# Patient Record
Sex: MALE | Race: WHITE | Employment: FULL TIME | URBAN - NONMETROPOLITAN AREA
[De-identification: names, ages, dates, MRNs, and addresses within clinical notes are randomized per-mention and may not be internally consistent; named-entity substitution may affect disease eponyms.]

---
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
## 2020-06-28 ENCOUNTER — HOSPITAL ENCOUNTER (EMERGENCY)
Facility: OTHER | Age: 20
Discharge: LEFT BEFORE DISCHARGE | End: 2020-06-28
Attending: FAMILY MEDICINE | Admitting: FAMILY MEDICINE

## 2020-06-28 VITALS
HEART RATE: 107 BPM | RESPIRATION RATE: 18 BRPM | HEIGHT: 73 IN | BODY MASS INDEX: 25.77 KG/M2 | WEIGHT: 194.45 LBS | DIASTOLIC BLOOD PRESSURE: 97 MMHG | OXYGEN SATURATION: 100 % | SYSTOLIC BLOOD PRESSURE: 144 MMHG | TEMPERATURE: 97.1 F

## 2020-06-28 DIAGNOSIS — R51.9 HEADACHE: ICD-10-CM

## 2020-06-28 DIAGNOSIS — M54.2 CERVICAL SPINE PAIN: ICD-10-CM

## 2020-06-28 DIAGNOSIS — F15.10 METHAMPHETAMINE USE (H): ICD-10-CM

## 2020-06-28 DIAGNOSIS — M54.9 BACK PAIN: ICD-10-CM

## 2020-06-28 DIAGNOSIS — F11.20 HEROIN ADDICTION (H): ICD-10-CM

## 2020-06-28 DIAGNOSIS — R00.0 TACHYCARDIA: ICD-10-CM

## 2020-06-28 LAB
AMPHETAMINES UR QL SCN: ABNORMAL
ANION GAP SERPL CALCULATED.3IONS-SCNC: 8 MMOL/L (ref 3–14)
APAP SERPL-MCNC: <0.2 UG/ML (ref 0–30)
BARBITURATES UR QL: NOT DETECTED
BASOPHILS # BLD AUTO: 0.1 10E9/L (ref 0–0.2)
BASOPHILS NFR BLD AUTO: 1 %
BENZODIAZ UR QL: NOT DETECTED
BUN SERPL-MCNC: 6 MG/DL (ref 7–25)
BUPRENORPHINE UR QL: NOT DETECTED NG/ML
CALCIUM SERPL-MCNC: 9.5 MG/DL (ref 8.6–10.3)
CANNABINOIDS UR QL: ABNORMAL NG/ML
CHLORIDE SERPL-SCNC: 107 MMOL/L (ref 98–107)
CO2 SERPL-SCNC: 28 MMOL/L (ref 21–31)
COCAINE UR QL: NOT DETECTED
CREAT SERPL-MCNC: 0.86 MG/DL (ref 0.7–1.3)
D-METHAMPHET UR QL: ABNORMAL NG/ML
DIFFERENTIAL METHOD BLD: NORMAL
EOSINOPHIL # BLD AUTO: 0.1 10E9/L (ref 0–0.7)
EOSINOPHIL NFR BLD AUTO: 1.6 %
ERYTHROCYTE [DISTWIDTH] IN BLOOD BY AUTOMATED COUNT: 11.9 % (ref 10–15)
ETHANOL SERPL-MCNC: <0.01 %
GFR SERPL CREATININE-BSD FRML MDRD: >90 ML/MIN/{1.73_M2}
GLUCOSE SERPL-MCNC: 94 MG/DL (ref 70–105)
HCT VFR BLD AUTO: 40.4 % (ref 40–53)
HGB BLD-MCNC: 14 G/DL (ref 13.3–17.7)
IMM GRANULOCYTES # BLD: 0 10E9/L (ref 0–0.4)
IMM GRANULOCYTES NFR BLD: 0.3 %
LYMPHOCYTES # BLD AUTO: 2.7 10E9/L (ref 0.8–5.3)
LYMPHOCYTES NFR BLD AUTO: 38.7 %
MCH RBC QN AUTO: 29.5 PG (ref 26.5–33)
MCHC RBC AUTO-ENTMCNC: 34.7 G/DL (ref 31.5–36.5)
MCV RBC AUTO: 85 FL (ref 78–100)
METHADONE UR QL SCN: NOT DETECTED
MONOCYTES # BLD AUTO: 0.4 10E9/L (ref 0–1.3)
MONOCYTES NFR BLD AUTO: 6 %
NEUTROPHILS # BLD AUTO: 3.6 10E9/L (ref 1.6–8.3)
NEUTROPHILS NFR BLD AUTO: 52.4 %
OPIATES UR QL SCN: NOT DETECTED
OXYCODONE UR QL: NOT DETECTED NG/ML
PCP UR QL SCN: NOT DETECTED
PLATELET # BLD AUTO: 312 10E9/L (ref 150–450)
POTASSIUM SERPL-SCNC: 3.5 MMOL/L (ref 3.5–5.1)
PROPOXYPH UR QL: NOT DETECTED NG/ML
RBC # BLD AUTO: 4.74 10E12/L (ref 4.4–5.9)
SALICYLATES SERPL-MCNC: <0 MG/DL (ref 15–30)
SODIUM SERPL-SCNC: 143 MMOL/L (ref 134–144)
TRICYCLICS UR QL SCN: NOT DETECTED NG/ML
TSH SERPL DL<=0.05 MIU/L-ACNC: 0.47 IU/ML (ref 0.34–5.6)
WBC # BLD AUTO: 6.9 10E9/L (ref 4–11)

## 2020-06-28 PROCEDURE — 85025 COMPLETE CBC W/AUTO DIFF WBC: CPT | Performed by: PHYSICIAN ASSISTANT

## 2020-06-28 PROCEDURE — 99283 EMERGENCY DEPT VISIT LOW MDM: CPT | Mod: Z6 | Performed by: PHYSICIAN ASSISTANT

## 2020-06-28 PROCEDURE — 84443 ASSAY THYROID STIM HORMONE: CPT | Performed by: PHYSICIAN ASSISTANT

## 2020-06-28 PROCEDURE — 80307 DRUG TEST PRSMV CHEM ANLYZR: CPT | Performed by: PHYSICIAN ASSISTANT

## 2020-06-28 PROCEDURE — 80329 ANALGESICS NON-OPIOID 1 OR 2: CPT | Performed by: PHYSICIAN ASSISTANT

## 2020-06-28 PROCEDURE — 36415 COLL VENOUS BLD VENIPUNCTURE: CPT | Performed by: PHYSICIAN ASSISTANT

## 2020-06-28 PROCEDURE — 99283 EMERGENCY DEPT VISIT LOW MDM: CPT | Performed by: PHYSICIAN ASSISTANT

## 2020-06-28 PROCEDURE — 80320 DRUG SCREEN QUANTALCOHOLS: CPT | Performed by: PHYSICIAN ASSISTANT

## 2020-06-28 PROCEDURE — 80048 BASIC METABOLIC PNL TOTAL CA: CPT | Performed by: PHYSICIAN ASSISTANT

## 2020-06-28 ASSESSMENT — ENCOUNTER SYMPTOMS
HEADACHES: 1
ADENOPATHY: 0
CHILLS: 0
BACK PAIN: 1
CONFUSION: 0
VOMITING: 0
DIARRHEA: 1
BRUISES/BLEEDS EASILY: 0
HEMATURIA: 0
SHORTNESS OF BREATH: 0
NECK PAIN: 1
NAUSEA: 1
FEVER: 0
FATIGUE: 1
WOUND: 0
ABDOMINAL PAIN: 0
CHEST TIGHTNESS: 0

## 2020-06-28 ASSESSMENT — MIFFLIN-ST. JEOR: SCORE: 1950.88
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
## 2020-06-29 NOTE — ED NOTES
Pt states that he want to leave, he feels like he can get help on his own. Provider is aware and also spoke with patient.
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
## 2023-06-23 ENCOUNTER — OFFICE VISIT (OUTPATIENT)
Dept: FAMILY MEDICINE | Facility: CLINIC | Age: 23
End: 2023-06-23
Payer: COMMERCIAL

## 2023-06-23 VITALS
SYSTOLIC BLOOD PRESSURE: 120 MMHG | DIASTOLIC BLOOD PRESSURE: 70 MMHG | BODY MASS INDEX: 31.15 KG/M2 | RESPIRATION RATE: 20 BRPM | OXYGEN SATURATION: 98 % | WEIGHT: 230 LBS | HEART RATE: 70 BPM | TEMPERATURE: 98 F | HEIGHT: 72 IN

## 2023-06-23 DIAGNOSIS — R53.83 OTHER FATIGUE: ICD-10-CM

## 2023-06-23 DIAGNOSIS — Z11.59 NEED FOR HEPATITIS C SCREENING TEST: ICD-10-CM

## 2023-06-23 DIAGNOSIS — F19.11 HISTORY OF SUBSTANCE ABUSE (H): ICD-10-CM

## 2023-06-23 DIAGNOSIS — Z13.220 SCREENING CHOLESTEROL LEVEL: ICD-10-CM

## 2023-06-23 DIAGNOSIS — I10 HYPERTENSION, UNSPECIFIED TYPE: ICD-10-CM

## 2023-06-23 DIAGNOSIS — Z00.00 ROUTINE GENERAL MEDICAL EXAMINATION AT A HEALTH CARE FACILITY: Primary | ICD-10-CM

## 2023-06-23 DIAGNOSIS — Z11.4 SCREENING FOR HIV (HUMAN IMMUNODEFICIENCY VIRUS): ICD-10-CM

## 2023-06-23 DIAGNOSIS — Z13.1 SCREENING FOR DIABETES MELLITUS: ICD-10-CM

## 2023-06-23 LAB
ALBUMIN SERPL BCG-MCNC: 4.8 G/DL (ref 3.5–5.2)
ALP SERPL-CCNC: 112 U/L (ref 40–129)
ALT SERPL W P-5'-P-CCNC: 39 U/L (ref 0–70)
ANION GAP SERPL CALCULATED.3IONS-SCNC: 11 MMOL/L (ref 7–15)
AST SERPL W P-5'-P-CCNC: 30 U/L (ref 0–45)
BASOPHILS # BLD AUTO: 0.1 10E3/UL (ref 0–0.2)
BASOPHILS NFR BLD AUTO: 1 %
BILIRUB SERPL-MCNC: 0.5 MG/DL
BUN SERPL-MCNC: 17 MG/DL (ref 6–20)
CALCIUM SERPL-MCNC: 9.5 MG/DL (ref 8.6–10)
CHLORIDE SERPL-SCNC: 105 MMOL/L (ref 98–107)
CHOLEST SERPL-MCNC: 180 MG/DL
CREAT SERPL-MCNC: 0.71 MG/DL (ref 0.67–1.17)
DEPRECATED CALCIDIOL+CALCIFEROL SERPL-MC: 20 UG/L (ref 20–75)
DEPRECATED HCO3 PLAS-SCNC: 24 MMOL/L (ref 22–29)
EOSINOPHIL # BLD AUTO: 0.2 10E3/UL (ref 0–0.7)
EOSINOPHIL NFR BLD AUTO: 3 %
ERYTHROCYTE [DISTWIDTH] IN BLOOD BY AUTOMATED COUNT: 12.5 % (ref 10–15)
GFR SERPL CREATININE-BSD FRML MDRD: >90 ML/MIN/1.73M2
GLUCOSE SERPL-MCNC: 77 MG/DL (ref 70–99)
HBA1C MFR BLD: 5.1 % (ref 0–5.6)
HCT VFR BLD AUTO: 41.3 % (ref 40–53)
HCV AB SERPL QL IA: NONREACTIVE
HDLC SERPL-MCNC: 44 MG/DL
HGB BLD-MCNC: 14 G/DL (ref 13.3–17.7)
HIV 1+2 AB+HIV1 P24 AG SERPL QL IA: NONREACTIVE
IMM GRANULOCYTES # BLD: 0 10E3/UL
IMM GRANULOCYTES NFR BLD: 0 %
LDLC SERPL CALC-MCNC: 111 MG/DL
LYMPHOCYTES # BLD AUTO: 2.3 10E3/UL (ref 0.8–5.3)
LYMPHOCYTES NFR BLD AUTO: 40 %
MCH RBC QN AUTO: 28.2 PG (ref 26.5–33)
MCHC RBC AUTO-ENTMCNC: 33.9 G/DL (ref 31.5–36.5)
MCV RBC AUTO: 83 FL (ref 78–100)
MONOCYTES # BLD AUTO: 0.4 10E3/UL (ref 0–1.3)
MONOCYTES NFR BLD AUTO: 7 %
NEUTROPHILS # BLD AUTO: 2.7 10E3/UL (ref 1.6–8.3)
NEUTROPHILS NFR BLD AUTO: 48 %
NONHDLC SERPL-MCNC: 136 MG/DL
PLATELET # BLD AUTO: 287 10E3/UL (ref 150–450)
POTASSIUM SERPL-SCNC: 4.3 MMOL/L (ref 3.4–5.3)
PROT SERPL-MCNC: 7.4 G/DL (ref 6.4–8.3)
RBC # BLD AUTO: 4.96 10E6/UL (ref 4.4–5.9)
SODIUM SERPL-SCNC: 140 MMOL/L (ref 136–145)
TRIGL SERPL-MCNC: 125 MG/DL
TSH SERPL DL<=0.005 MIU/L-ACNC: 2 UIU/ML (ref 0.3–4.2)
VIT B12 SERPL-MCNC: 436 PG/ML (ref 232–1245)
WBC # BLD AUTO: 5.7 10E3/UL (ref 4–11)

## 2023-06-23 PROCEDURE — 82607 VITAMIN B-12: CPT | Performed by: FAMILY MEDICINE

## 2023-06-23 PROCEDURE — 80061 LIPID PANEL: CPT | Performed by: FAMILY MEDICINE

## 2023-06-23 PROCEDURE — 82306 VITAMIN D 25 HYDROXY: CPT | Performed by: FAMILY MEDICINE

## 2023-06-23 PROCEDURE — 83036 HEMOGLOBIN GLYCOSYLATED A1C: CPT | Performed by: FAMILY MEDICINE

## 2023-06-23 PROCEDURE — 86803 HEPATITIS C AB TEST: CPT | Performed by: FAMILY MEDICINE

## 2023-06-23 PROCEDURE — 36415 COLL VENOUS BLD VENIPUNCTURE: CPT | Performed by: FAMILY MEDICINE

## 2023-06-23 PROCEDURE — 99214 OFFICE O/P EST MOD 30 MIN: CPT | Mod: 25 | Performed by: FAMILY MEDICINE

## 2023-06-23 PROCEDURE — 80050 GENERAL HEALTH PANEL: CPT | Performed by: FAMILY MEDICINE

## 2023-06-23 PROCEDURE — 87389 HIV-1 AG W/HIV-1&-2 AB AG IA: CPT | Performed by: FAMILY MEDICINE

## 2023-06-23 PROCEDURE — 99385 PREV VISIT NEW AGE 18-39: CPT | Performed by: FAMILY MEDICINE

## 2023-06-23 RX ORDER — QUETIAPINE FUMARATE 100 MG/1
100 TABLET, FILM COATED ORAL
COMMUNITY
Start: 2023-06-21 | End: 2023-09-26

## 2023-06-23 RX ORDER — VIT C/B6/B5/MAGNESIUM/HERB 173 50-5-6-5MG
2000 CAPSULE ORAL DAILY
COMMUNITY
Start: 2023-01-06

## 2023-06-23 RX ORDER — MIRTAZAPINE 30 MG/1
30 TABLET, ORALLY DISINTEGRATING ORAL
COMMUNITY
Start: 2022-12-30 | End: 2023-09-26

## 2023-06-23 RX ORDER — LISINOPRIL 5 MG/1
TABLET ORAL
COMMUNITY
Start: 2023-06-21 | End: 2023-06-23

## 2023-06-23 RX ORDER — BUPRENORPHINE HYDROCHLORIDE, NALOXONE HYDROCHLORIDE 12; 3 MG/1; MG/1
FILM, SOLUBLE BUCCAL; SUBLINGUAL
COMMUNITY
Start: 2023-05-31 | End: 2023-09-26

## 2023-06-23 ASSESSMENT — ANXIETY QUESTIONNAIRES
5. BEING SO RESTLESS THAT IT IS HARD TO SIT STILL: MORE THAN HALF THE DAYS
3. WORRYING TOO MUCH ABOUT DIFFERENT THINGS: NOT AT ALL
GAD7 TOTAL SCORE: 4
GAD7 TOTAL SCORE: 4
7. FEELING AFRAID AS IF SOMETHING AWFUL MIGHT HAPPEN: NOT AT ALL
6. BECOMING EASILY ANNOYED OR IRRITABLE: NOT AT ALL
1. FEELING NERVOUS, ANXIOUS, OR ON EDGE: MORE THAN HALF THE DAYS
IF YOU CHECKED OFF ANY PROBLEMS ON THIS QUESTIONNAIRE, HOW DIFFICULT HAVE THESE PROBLEMS MADE IT FOR YOU TO DO YOUR WORK, TAKE CARE OF THINGS AT HOME, OR GET ALONG WITH OTHER PEOPLE: VERY DIFFICULT
2. NOT BEING ABLE TO STOP OR CONTROL WORRYING: NOT AT ALL

## 2023-06-23 ASSESSMENT — ENCOUNTER SYMPTOMS
DIARRHEA: 0
DIZZINESS: 0
NAUSEA: 0
FREQUENCY: 0
PALPITATIONS: 0
HEMATOCHEZIA: 0
HEARTBURN: 0
JOINT SWELLING: 0
PARESTHESIAS: 0
WEAKNESS: 0
HEADACHES: 0
CHILLS: 0
ARTHRALGIAS: 0
HEMATURIA: 0
NERVOUS/ANXIOUS: 0
EYE PAIN: 0
SORE THROAT: 0
CONSTIPATION: 0
DYSURIA: 0
SHORTNESS OF BREATH: 0
COUGH: 0
FEVER: 0
MYALGIAS: 0
ABDOMINAL PAIN: 0

## 2023-06-23 ASSESSMENT — PAIN SCALES - GENERAL: PAINLEVEL: NO PAIN (0)

## 2023-06-23 ASSESSMENT — PATIENT HEALTH QUESTIONNAIRE - PHQ9
SUM OF ALL RESPONSES TO PHQ QUESTIONS 1-9: 11
5. POOR APPETITE OR OVEREATING: NOT AT ALL

## 2023-06-23 NOTE — LETTER
June 26, 2023      Ilir Pal  [address]   [place] [address]        Dear ,    Here is a copy of your test results, as requested.     Resulted Orders   HIV Antigen Antibody Combo   Result Value Ref Range    HIV Antigen Antibody Combo Nonreactive Nonreactive      Comment:      HIV-1 p24 Ag & HIV-1/HIV-2 Ab Not Detected   Hepatitis C Screen Reflex to HCV RNA Quant and Genotype   Result Value Ref Range    Hepatitis C Antibody Nonreactive Nonreactive    Narrative    Assay performance characteristics have not been established for newborns, infants, and children.   Comprehensive metabolic panel (BMP + Alb, Alk Phos, ALT, AST, Total. Bili, TP)   Result Value Ref Range    Sodium 140 136 - 145 mmol/L    Potassium 4.3 3.4 - 5.3 mmol/L    Chloride 105 98 - 107 mmol/L    Carbon Dioxide (CO2) 24 22 - 29 mmol/L    Anion Gap 11 7 - 15 mmol/L    Urea Nitrogen 17.0 6.0 - 20.0 mg/dL    Creatinine 0.71 0.67 - 1.17 mg/dL    Calcium 9.5 8.6 - 10.0 mg/dL    Glucose 77 70 - 99 mg/dL    Alkaline Phosphatase 112 40 - 129 U/L    AST 30 0 - 45 U/L      Comment:      Reference intervals for this test were updated on 6/12/2023 to more accurately reflect our healthy population. There may be differences in the flagging of prior results with similar values performed with this method. Interpretation of those prior results can be made in the context of the updated reference intervals.    ALT 39 0 - 70 U/L      Comment:      Reference intervals for this test were updated on 6/12/2023 to more accurately reflect our healthy population. There may be differences in the flagging of prior results with similar values performed with this method. Interpretation of those prior results can be made in the context of the updated reference intervals.      Protein Total 7.4 6.4 - 8.3 g/dL    Albumin 4.8 3.5 - 5.2 g/dL    Bilirubin Total 0.5 <=1.2 mg/dL    GFR Estimate >90 >60 mL/min/1.73m2   TSH with free T4 reflex   Result Value Ref Range     TSH 2.00 0.30 - 4.20 uIU/mL   Vitamin D Deficiency   Result Value Ref Range    Vitamin D, Total (25-Hydroxy) 20 20 - 75 ug/L    Narrative    Season, race, dietary intake, and treatment affect the concentration of 25-hydroxy-Vitamin D. Values may decrease during winter months and increase during summer months. Values 20-29 ug/L may indicate Vitamin D insufficiency and values <20 ug/L may indicate Vitamin D deficiency.    Vitamin D determination is routinely performed by an immunoassay specific for 25 hydroxyvitamin D3.  If an individual is on vitamin D2(ergocalciferol) supplementation, please specify 25 OH vitamin D2 and D3 level determination by LCMSMS test VITD23.     Vitamin B12   Result Value Ref Range    Vitamin B12 436 232 - 1,245 pg/mL   Hemoglobin A1c   Result Value Ref Range    Hemoglobin A1C 5.1 0.0 - 5.6 %      Comment:      Normal <5.7%   Prediabetes 5.7-6.4%    Diabetes 6.5% or higher     Note: Adopted from ADA consensus guidelines.   Lipid panel reflex to direct LDL Non-fasting   Result Value Ref Range    Cholesterol 180 <200 mg/dL    Triglycerides 125 <150 mg/dL    Direct Measure HDL 44 >=40 mg/dL    LDL Cholesterol Calculated 111 (H) <=100 mg/dL    Non HDL Cholesterol 136 (H) <130 mg/dL    Narrative    Cholesterol  Desirable:  <200 mg/dL    Triglycerides  Normal:  Less than 150 mg/dL  Borderline High:  150-199 mg/dL  High:  200-499 mg/dL  Very High:  Greater than or equal to 500 mg/dL    Direct Measure HDL  Female:  Greater than or equal to 50 mg/dL   Male:  Greater than or equal to 40 mg/dL    LDL Cholesterol  Desirable:  <100mg/dL  Above Desirable:  100-129 mg/dL   Borderline High:  130-159 mg/dL   High:  160-189 mg/dL   Very High:  >= 190 mg/dL    Non HDL Cholesterol  Desirable:  130 mg/dL  Above Desirable:  130-159 mg/dL  Borderline High:  160-189 mg/dL  High:  190-219 mg/dL  Very High:  Greater than or equal to 220 mg/dL   CBC with platelets and differential   Result Value Ref Range    WBC Count  5.7 4.0 - 11.0 10e3/uL    RBC Count 4.96 4.40 - 5.90 10e6/uL    Hemoglobin 14.0 13.3 - 17.7 g/dL    Hematocrit 41.3 40.0 - 53.0 %    MCV 83 78 - 100 fL    MCH 28.2 26.5 - 33.0 pg    MCHC 33.9 31.5 - 36.5 g/dL    RDW 12.5 10.0 - 15.0 %    Platelet Count 287 150 - 450 10e3/uL    % Neutrophils 48 %    % Lymphocytes 40 %    % Monocytes 7 %    % Eosinophils 3 %    % Basophils 1 %    % Immature Granulocytes 0 %    Absolute Neutrophils 2.7 1.6 - 8.3 10e3/uL    Absolute Lymphocytes 2.3 0.8 - 5.3 10e3/uL    Absolute Monocytes 0.4 0.0 - 1.3 10e3/uL    Absolute Eosinophils 0.2 0.0 - 0.7 10e3/uL    Absolute Basophils 0.1 0.0 - 0.2 10e3/uL    Absolute Immature Granulocytes 0.0 <=0.4 10e3/uL       If you have any questions or concerns, please call the clinic at the number listed above.       Sincerely,      Enio Valdez, DO

## 2023-06-23 NOTE — PATIENT INSTRUCTIONS
Follow up with suboxone clinic/ Psychiatry.     Lab work today.     Stop Lisinopril     BP goal is less than 140/90    HOW TO CHECK YOUR BLOOD PRESSURE AT HOME:      Avoid eating, smoking, and exercising for at least 30 minutes before taking a reading.     Be sure you have taken your BP medication at least 2-3 hours before you check it.      Sit quietly for 10 minutes before a reading.      Sit in a chair with your feet flat on the floor. Rest your  arm on a table so that the arm cuff is at the same level as your heart.     Remain still during the reading.     Record your blood pressure and pulse in a log and bring to your next appointment.        Sleep Hygiene    1. Avoid doing anything stressful in the hour before bedtime. Avoid paying bills, watching politics on the news, etc.  2. Avoid screens just before bedtime. This includes cell phones, iPad, computers, TV. The bright lights on the screen is very stimulating to the brain, and makes it difficult to follow asleep and stay asleep  3. Avoid alcohol. Alcohol can sometimes make it easier to fall asleep, but significantly reduces the chance that you will stay asleep. It also impairs REM sleep, which is the good restful sleep  4. Use the bed for sleep and sex only. Avoid eating, reading, watching TV in bed  5. If you feel very restless at bedtime, try doing mundane physical activities such as vacuuming, folding laundry, etc.  6. If you find yourself making lists in your head at night, keep pen and paper at the bedside. Write down these lists. Also visualize yourself checking that item off your list and removing it from your brain.  7. Keep the room cool and dark. Consider investing in late darkening curtains  8. Avoid drinking excessive fluids just before bedtime. Empty your bladder just before bedtime  9. Cognitive behavioral therapy (CBT) has been proven to be highly effective to treat insomnia.           Preventive Health Recommendations  Male Ages 21 - 25      Yearly exam:             See your health care provider every year in order to  o   Review health changes.   o   Discuss preventive care.    o   Review your medicines if your doctor has prescribed any.  You should be tested each year for STDs (sexually transmitted diseases).   Talk to your provider about cholesterol testing.    If you are at risk for diabetes, you should have a diabetes test (fasting glucose).    Shots: Get a flu shot each year. Get a tetanus shot every 10 years.     Nutrition:  Eat at least 5 servings of fruits and vegetables daily.   Eat whole-grain bread, whole-wheat pasta and brown rice instead of white grains and rice.   Get adequate calcium and Vitamin D.     Lifestyle  Exercise for at least 150 minutes a week (30 minutes a day, 5 days a week). This will help you control your weight and prevent disease.   Limit alcohol to one drink per day.   No smoking.   Wear sunscreen to prevent skin cancer.   See your dentist every six months for an exam and cleaning.

## 2023-06-23 NOTE — PROGRESS NOTES
SUBJECTIVE:   CC: Ilir is an 22 year old who presents for preventative health visit.                  Healthy Habits:     Getting at least 3 servings of Calcium per day:  Yes    Bi-annual eye exam:  Yes    Dental care twice a year:  Yes    Sleep apnea or symptoms of sleep apnea:  Daytime drowsiness    Diet:  Regular (no restrictions)    Frequency of exercise:  4-5 days/week    Duration of exercise:  Less than 15 minutes    Taking medications regularly:  Yes    Medication side effects:  Not applicable and None    PHQ-2 Total Score: 2    Additional concerns today:  No      22-year-old male who presents to clinic for annual physical and to establish care.      History of substance abuse   8 months sober. Meth and heroin drugs of choice in the past.     Inpatient treatment in [place]  Now doing outpatient treatment in [place]. Now living at a sober house. Plans to go and move in with his grandmother in August.     Currently follows with suboxone clinic Dr. Srinivasan in [place]    Currently on suboxone   Follows with Dr. Srinivasan     Reports significant fatigue even after sleeping for prolonged periods.   Symptoms worsened with mirtazapine and also quetiapine. He has only been using these on the weekends. He does not notice any change in his fatigue when he takes the medications or not.   Even when not taking these medications will be quite fatigued.       HTN  Elevated blood pressure when using drugs.   On Lisinopril 5 mg about twice per week.   Has not used this for the past week.   BP within goal range.       Chief Complaint   Patient presents with     Physical     Establish Care     Fatigue     8 months in recovery. Possible ADHD. Will be needing dental work and wondering what the pain management will look like?         Have you ever done Advance Care Planning? (For example, a Health Directive, POLST, or a discussion with a medical provider or your loved ones about your wishes): No, advance care planning  "information given to patient to review.  Patient declined advance care planning discussion at this time.    Social History     Tobacco Use     Smoking status: Never     Passive exposure: Yes     Smokeless tobacco: Never     Tobacco comments:     Quit smoking: dad outside on occasion   Substance Use Topics     Alcohol use: No     Alcohol/week: 0.0 standard drinks of alcohol             6/23/2023     9:32 AM   Alcohol Use   Prescreen: >3 drinks/day or >7 drinks/week? No       Last PSA: No results found for: PSA    Reviewed orders with patient. Reviewed health maintenance and updated orders accordingly - Yes    Reviewed and updated as needed this visit by clinical staff   Tobacco  Allergies  Meds  Problems  Med Hx  Surg Hx  Fam Hx          Reviewed and updated as needed this visit by Provider                     Review of Systems   Constitutional: Negative for chills and fever.   HENT: Negative for congestion, ear pain, hearing loss and sore throat.    Eyes: Negative for pain and visual disturbance.   Respiratory: Negative for cough and shortness of breath.    Cardiovascular: Negative for chest pain, palpitations and peripheral edema.   Gastrointestinal: Negative for abdominal pain, constipation, diarrhea, heartburn, hematochezia and nausea.   Genitourinary: Negative for dysuria, frequency, genital sores, hematuria, impotence, penile discharge and urgency.   Musculoskeletal: Negative for arthralgias, joint swelling and myalgias.   Skin: Negative for rash.   Neurological: Negative for dizziness, weakness, headaches and paresthesias.   Psychiatric/Behavioral: Negative for mood changes. The patient is not nervous/anxious.        OBJECTIVE:   /70 (BP Location: Left arm, Patient Position: Chair, Cuff Size: Adult Regular)   Pulse 70   Temp 98  F (36.7  C) (Tympanic)   Resp 20   Ht 1.816 m (5' 11.5\")   Wt 104.3 kg (230 lb)   SpO2 98%   BMI 31.63 kg/m      Physical Exam  GENERAL: healthy, alert and no " distress  EYES: Eyes grossly normal to inspection, PERRL and conjunctivae and sclerae normal  HENT: ear canals and TM's normal, nose and mouth without ulcers or lesions  NECK: no adenopathy, no asymmetry, masses, or scars and thyroid normal to palpation  RESP: lungs clear to auscultation - no rales, rhonchi or wheezes  CV: regular rate and rhythm, normal S1 S2, no S3 or S4, no murmur, click or rub, no peripheral edema and peripheral pulses strong  ABDOMEN: soft, nontender, no hepatosplenomegaly, no masses and bowel sounds normal  MS: no gross musculoskeletal defects noted, no edema  SKIN: no suspicious lesions or rashes  NEURO: Normal strength and tone, mentation intact and speech normal  PSYCH: mentation appears normal, affect normal/bright    ASSESSMENT/PLAN:   Ilir was seen today for physical, establish care and fatigue.    Diagnoses and all orders for this visit:    Routine general medical examination at a health care facility    Need for hepatitis C screening test  -     Hepatitis C Screen Reflex to HCV RNA Quant and Genotype    Screening for HIV (human immunodeficiency virus)  -     HIV Antigen Antibody Combo    Screening for diabetes mellitus  -     Hemoglobin A1c    Screening cholesterol level  -     Lipid panel reflex to direct LDL Non-fasting    History of substance abuse (H)  History of meth and heroin use. 8 months sober. Completed inpatient treatment in [place]. Now living in sober house, plans to move in with grandma in the future.   -- Currently on suboxone and getting this through Psychiatry Dr. Srinivasan.  -- wishes to establish with a closer suboxone provider. Referral placed for patient to establish care with new Psychiatrist for medication management.   -     Adult Mental Health  Referral; Future    Other fatigue  Reports chronic fatigue. No recent blood test. Will check labs today. Symptoms could be related to prior drug use.   -     CBC with platelets and differential  -      Comprehensive metabolic panel (BMP + Alb, Alk Phos, ALT, AST, Total. Bili, TP)  -     TSH with free T4 reflex  -     Vitamin D Deficiency  -     Vitamin B12  -     Adult Mental Health  Referral; Future    Hypertension, unspecified type  Taking lisinopril 2-3 times per week. Has not taken it in the last week. BP well controlled at 120/70. HTN in past likely related to drug use.   -- Plan to stop lisinopril, monitor blood pressure at home.     Patient has been advised of split billing requirements and indicates understanding: Yes      COUNSELING:   Reviewed preventive health counseling, as reflected in patient instructions       Regular exercise       Healthy diet/nutrition        He reports that he has never smoked. He has been exposed to tobacco smoke. He has never used smokeless tobacco.            Enio Valdez DO  [facility]

## 2023-07-26 ENCOUNTER — OFFICE VISIT (OUTPATIENT)
Dept: FAMILY MEDICINE | Facility: CLINIC | Age: 23
End: 2023-07-26
Payer: COMMERCIAL

## 2023-07-26 VITALS
OXYGEN SATURATION: 98 % | DIASTOLIC BLOOD PRESSURE: 70 MMHG | SYSTOLIC BLOOD PRESSURE: 110 MMHG | WEIGHT: 236 LBS | HEIGHT: 72 IN | TEMPERATURE: 98 F | RESPIRATION RATE: 20 BRPM | BODY MASS INDEX: 31.97 KG/M2 | HEART RATE: 67 BPM

## 2023-07-26 DIAGNOSIS — R21 RASH AND NONSPECIFIC SKIN ERUPTION: ICD-10-CM

## 2023-07-26 DIAGNOSIS — K08.9 POOR DENTITION: ICD-10-CM

## 2023-07-26 DIAGNOSIS — L40.9 PSORIASIS: ICD-10-CM

## 2023-07-26 DIAGNOSIS — R53.82 CHRONIC FATIGUE: ICD-10-CM

## 2023-07-26 DIAGNOSIS — L71.9 ROSACEA: ICD-10-CM

## 2023-07-26 DIAGNOSIS — K12.2 ORAL INFECTION: ICD-10-CM

## 2023-07-26 DIAGNOSIS — F19.11 HISTORY OF SUBSTANCE ABUSE (H): Primary | ICD-10-CM

## 2023-07-26 DIAGNOSIS — E55.9 VITAMIN D DEFICIENCY: ICD-10-CM

## 2023-07-26 PROCEDURE — 36415 COLL VENOUS BLD VENIPUNCTURE: CPT | Performed by: FAMILY MEDICINE

## 2023-07-26 PROCEDURE — 99214 OFFICE O/P EST MOD 30 MIN: CPT | Performed by: FAMILY MEDICINE

## 2023-07-26 PROCEDURE — 84403 ASSAY OF TOTAL TESTOSTERONE: CPT | Performed by: FAMILY MEDICINE

## 2023-07-26 RX ORDER — BUPRENORPHINE HYDROCHLORIDE AND NALOXONE HYDROCHLORIDE DIHYDRATE 8; 2 MG/1; MG/1
TABLET SUBLINGUAL
COMMUNITY
Start: 2023-07-22

## 2023-07-26 RX ORDER — BUPROPION HYDROCHLORIDE 150 MG/1
1 TABLET ORAL
COMMUNITY
Start: 2023-07-21

## 2023-07-26 RX ORDER — CLINDAMYCIN HCL 300 MG
CAPSULE ORAL
COMMUNITY
Start: 2023-07-21

## 2023-07-26 RX ORDER — METRONIDAZOLE 7.5 MG/G
GEL TOPICAL 2 TIMES DAILY
Qty: 45 G | Refills: 1 | Status: SHIPPED | OUTPATIENT
Start: 2023-07-26 | End: 2023-09-26

## 2023-07-26 RX ORDER — CLONIDINE HYDROCHLORIDE 0.2 MG/1
0.2 TABLET ORAL AT BEDTIME
COMMUNITY
Start: 2023-07-21

## 2023-07-26 ASSESSMENT — PAIN SCALES - GENERAL: PAINLEVEL: NO PAIN (0)

## 2023-07-26 NOTE — LETTER
July 28, 2023      Ilir Pal  [address]  [place] [address]        Dear Sesar,    We are writing to inform you of your test results.  Testosterone level satisfactory.       Resulted Orders   Testosterone, total   Result Value Ref Range    Testosterone Total 288 240 - 950 ng/dL       If you have any questions or concerns, please call the clinic at the number listed above.       Sincerely,      Enio Valdez, DO

## 2023-07-26 NOTE — PATIENT INSTRUCTIONS
Start on metrogel twice daily for lesions on face.     Follow up with Dermatology.     Continue cares with Psychiatry.     Best of luck with upcoming dental procedure.     Continue to take vitamin d supplementation.     Lab work today.     Follow up in 2 months.

## 2023-07-26 NOTE — PROGRESS NOTES
Assessment & Plan     History of substance abuse (H)  Follows with Psychiatry.   On suboxone tablets. 1.5 tablets ( 8-2 mg )  On wellbutrin 150 mg daily.   Using clonidine 0.2 mg at night.   Follows with [facility] Psychiatrist.     Rash and nonspecific skin eruption  Erythematous lesion on face near nasolabial fold. Not responding to topical steroids. Treat with metrogel twice daily. Refer to Dermatology.   - Adult Dermatology Referral  - metroNIDAZOLE (METROGEL) 0.75 % external gel  Dispense: 45 g; Refill: 1    Psoriasis  Long standing history of this. Not well controlled. Wishes to see Dermatology. Referral placed.   - Adult Dermatology Referral    Rosacea  - metroNIDAZOLE (METROGEL) 0.75 % external gel  Dispense: 45 g; Refill: 1    Chronic fatigue  Vit D deficiency.   Fatigue, prior lab work with hepatitis C, HIV, CBC, CMP, TSH, vitamin B12, hemoglobin A1c, cholesterol testing returned satisfactory.  Had slightly low vitamin D at 20.  He has been on supplementation for the past month.  --Psychiatrist has concerns about potentially low testosterone.  Will obtain lab value for this today.  --I feel that his fatigue is likely related to his history of meth and heroin use.  - Testosterone, total  On Vit D supplementation.     Poor dentition   Mouth infection  On clindamycin for this. Has appt with Dentistry tomorrow. Plans to proceed with removal of 11+ teeth due to decay and cracking.     The risks, benefits and treatment options of prescribed medications or other treatments have been discussed with the patient. The patient verbalized their understanding and should call or follow up if no improvement or if they develop further problems.    Follow up in 2 months or sooner if needed.     >30 minutes spent on the date of the encounter doing chart review, history and exam, documentation and further activities as noted above      Enio Valdez DO  [facility] 22  "year old, presenting for the following health issues:  Hypertension        7/26/2023     10:43 AM   Additional Questions   Roomed by Mallory KUO   Accompanied by Paternal grandmother, Zaida     History of Present Illness       Reason for visit:  Recheck    He eats 0-1 servings of fruits and vegetables daily.He consumes 4 sweetened beverage(s) daily.He exercises with enough effort to increase his heart rate 60 or more minutes per day.  He exercises with enough effort to increase his heart rate 5 days per week.   He is taking medications regularly.       22 year old male who presents to clinic for follow up. Recently seen on 6/23/2023 for annual physical.     History of substance abuse   remains sober. Meth and heroin drugs of choice in the past.      Inpatient treatment in [place]  Now doing outpatient treatment in [place]. Now living at a sober house. Plans to go and move in with his grandmother in August.       On suboxone tablets. 1.5 tablets ( 8-2 mg )  On wellbutrin 150 mg daily.   Using clonidine 0.2 mg at night.   Follows with [facility] Psychiatrist.       Psoriasis  Currently using kenalog 0.5% cream and also tretinoin   Reports having psoriasis on the face, and scalp.  He reports trying all the shampoos without benefit.   Has quite long hair and discussed cutting this may help.       Mouth infection.   On Clindamycin 300 mg every 8 hours.   Sees dentist tomorrow for a consult as will need to have about 11 teeth removed.       Review of Systems   Constitutional, HEENT, cardiovascular, pulmonary, gi and gu systems are negative, except as otherwise noted.      Objective    /70 (BP Location: Left arm, Patient Position: Chair, Cuff Size: Adult Regular)   Pulse 67   Temp 98  F (36.7  C) (Tympanic)   Resp 20   Ht 1.816 m (5' 11.5\")   Wt 107 kg (236 lb)   SpO2 98%   BMI 32.46 kg/m    Body mass index is 32.46 kg/m .  Physical Exam   General: no acute distress  Mouth: very poor dentition.   HEENT: NC/AT, " PERRL, nares patent, oropharynx clear and non-erythematous.   Head: erythema, scaling near the hairline over the forehead. Long hair in dreads. Small patches of erythema and dry skin next to nasolabial folds and anterior to the left ear.   CV: RRR, no murmur  Resp: non-labored breathing, clear to auscultation, no wheezing or rales   Abdomen: Soft, non-tender, no guarding.   Extremities: No peripheral edema, calves non-tender.

## 2023-07-28 LAB — TESTOST SERPL-MCNC: 288 NG/DL (ref 240–950)

## 2023-08-25 NOTE — PROGRESS NOTES
Chief Complaint   Patient presents with    Ear Problem     History of perforation in left ear- did have surgery about age 12  with relief but in high school perforation noted again /audio     History of Present Illness  Ilir Pal is a 23 year old male who presents today for ear evaluation.  Has a history of ear tube placement as a child.  He had a persistent tympanic membrane perforation on the left-hand side.  He underwent tympanoplasty for closure when he was in middle school.  The patient does note that the hole was closed for some time but then apparently reoccurred.  He had a persistent hole on the left-hand side.  When he gets water in his ear, this bothers him.  He denies any significant drainage.  He does feel like the hearing is not as good on the left versus the right.  No significant dizziness, tinnitus, or vertigo.  He does have a past history of opioid dependence and does use Suboxone currently.      Past Medical History  Patient Active Problem List   Diagnosis    History of substance abuse (H)    HTN (hypertension)    Chronic fatigue    Psoriasis    Vitamin D deficiency     Current Medications    Current Outpatient Medications:     buprenorphine-naloxone (SUBOXONE) 8-2 MG SUBL sublingual tablet, PLACE 1.5 TABLET UNDER THE TONGUE TWICE DAILY, Disp: , Rfl:     buPROPion (WELLBUTRIN XL) 150 MG 24 hr tablet, Take 1 tablet by mouth daily at 2 pm, Disp: , Rfl:     clindamycin (CLEOCIN) 300 MG capsule, take 1 capsule by mouth every 8 hours, Disp: , Rfl:     clindamycin (CLINDAMAX) 1 % topical gel, Apply topically 2 times daily, Disp: , Rfl:     cloNIDine (CATAPRES) 0.2 MG tablet, Take 0.2 mg by mouth At Bedtime, Disp: , Rfl:     D 1000 25 MCG (1000 UT) CHEW, Take 2,000 Units by mouth daily, Disp: , Rfl:     metroNIDAZOLE (METROGEL) 0.75 % external gel, Apply topically 2 times daily, Disp: 45 g, Rfl: 1    mirtazapine (REMERON SOL-TAB) 30 MG ODT, Take 30 mg by mouth nightly as needed, Disp: , Rfl:      QUEtiapine (SEROQUEL) 100 MG tablet, Take 100 mg by mouth nightly as needed, Disp: , Rfl:     tretinoin (RETIN-A) 0.025 % cream, Apply topically At Bedtime Apply to affected areas., Disp: , Rfl:     triamcinolone (KENALOG) 0.5 % cream, Apply topically 2 times daily Apply to affected areas., Disp: , Rfl:     SUBOXONE 12-3 MG FILM per film, PLACE 1 FILM UNDER THE TONGUE TWICE DAILY. PLEASE SEE FULL ATTACHED DIRECTIONS (Patient not taking: Reported on 8/30/2023), Disp: , Rfl:     Allergies  Allergies   Allergen Reactions    Penicillins Rash       Social History  Social History     Socioeconomic History    Marital status: Single   Tobacco Use    Smoking status: Never     Passive exposure: Yes    Smokeless tobacco: Never    Tobacco comments:     Quit smoking: dad outside on occasion   Vaping Use    Vaping Use: Every day    Start date: 10/23/2022    Substances: Nicotine, Flavoring    Devices: Disposable   Substance and Sexual Activity    Alcohol use: No     Alcohol/week: 0.0 standard drinks of alcohol    Drug use: Unknown     Types: Other     Comment: Drug use: No   Social History Narrative    Mom has 4 children.    Ilir loves school.   Lives with parents and sibs. Involved in sports.       Family History  Family History   Problem Relation Age of Onset    Substance Abuse Mother     Coronary Artery Disease Father         MI    Diabetes Maternal Grandmother     Coronary Artery Disease Maternal Grandmother         stents    Alcoholism Maternal Grandfather     Other - See Comments Paternal Grandfather         Stroke    Diabetes Paternal Grandfather         Diabetes    Diabetes Paternal Uncle         Diabetes    Cancer Other         Cancer,PAT GT AUNT       Review of Systems  As per HPI and PMHx, otherwise 10 system review including the head and neck, constitutional, eyes, respiratory, GI, skin, neurologic, lymphatic, endocrine, and allergy systems is negative.    Physical Exam  /84 (BP Location: Right arm, Patient  Position: Chair, Cuff Size: Adult Regular)   Pulse 78   Temp 98.4  F (36.9  C) (Tympanic)   Wt 107 kg (236 lb)   BMI 32.46 kg/m    GENERAL: Patient is a pleasant, cooperative 23 year old male in no acute distress.  HEAD: Normocephalic, atraumatic.  Hair and scalp are normal.  EYES: Pupils are equal, round, reactive to light and accommodation.  Extraocular movements are intact.  The sclera nonicteric without injection.  The extraocular structures are normal.  EARS: See below.  NOSE: Nares are patent.  Nasal mucosa is pink and moist.  Negative anterior rhinoscopy.  NEUROLOGIC: Cranial nerves II through XII are grossly intact.  Voice is strong.  Patient is House-Brackmann I/VI bilaterally.  CARDIOVASCULAR: Extremities are warm and well-perfused.  No significant peripheral edema.  RESPIRATORY: Patient has nonlabored breathing without cough, wheeze, stridor.  PSYCHIATRIC: Patient is alert and oriented.  Mood and affect appear normal.  SKIN: Warm and dry.  No scalp, face, or neck lesions noted.    Physical Exam and Procedure    EARS: Normal shape and symmetry.  No tenderness when palpating the mastoid or tragal areas bilaterally.  The ears were then examined under the otic binocular microscope to perform cerumen removal.  Otoscopic exam on the right reveals impacted cerumen down to the level of the tympanic membrane.  The cerumen was removed with #5 suction, #7 suction, and alligator forceps.  The right tympanic membrane was round, intact without evidence of effusion.  No retraction, granulation, drainage, or evidence of cholesteatoma.      Attention was turned to the left ear.  Otoscopic exam on the left reveals a clear canal.  The left tympanic membrane has a roughly 20% perforation in the posterior portion of the tympanic membrane.  The perforation is clean and dry.  He does have a slight amount of retraction and a slight myringoincudostapediopexy.  The perforation does abut the annulus.  He does have a slight  amount of attic retraction.  There is no deep retraction pockets, granulation, drainage, or evidence of cholesteatoma.                 Audiogram  The patient underwent an audiogram performed today.  My review of the audiogram shows more hearing in the right ear and mild conductive hearing loss in the left ear.  Pure-tone average is 6 dB on the right and 23 dB on the left.  Speech reception threshold is 10 dB on the right and 30 dB on the left.  The patient had 100% word recognition on the right and 100% word recognition on the left.  The patient had a type A tympanogram on the right and a large volume type B tympanogram on the left.     Assessment and Plan    ICD-10-CM    1. Perforation of tympanic membrane, left  H72.92 Microscopy, Binocular     Adult Audiology  Referral      2. Conductive hearing loss of left ear with unrestricted hearing of right ear  H90.12 Microscopy, Binocular     Adult Audiology  Referral      3. History of tympanoplasty of left ear  Z98.890 Microscopy, Binocular     Adult Audiology  Referral      4. Impacted cerumen of right ear  H61.21 Microscopy, Binocular     Adult Audiology  Referral      5. Tobacco dependence  F17.200       6. Encounter for tobacco use cessation counseling  Z71.6          It was my pleasure seeing Ilir and their family today in clinic.  The patient does have a mild conductive hearing loss in the left ear and a left tympanic membrane perforation.  He has had previous tympanoplasty, however this failed.  The perforation is very posteriorly oriented and is adjacent to the annulus.  Given his previous failure, I think that we could consider an attempt at a cartilage graft tympanoplasty.  Now that he is a bit older and his canal is bigger, I think we can do this transcanal an endoscopic approach.    We discussed the risks, benefits, alternatives, options of left cartilage graft tympanoplasty including, but not limited to: Risk of  bleeding, risk of infection, risk of persistent tympanic membrane perforation, potential need for additional procedures, risk of general anesthesia.  We discussed the postoperative course and convalescence including dry ear precautions after surgery until the eardrum is healed.  The patient and patient's family understands and are willing to proceed.    The patient will follow up as necessary for worsening symptoms or changes in symptoms.     The plan was discussed in detail with the patient's family.  Questions were answered the best my ability.  They voiced understanding agree with the plan.    Andrei Moyer MD  Department of Otolaryngology-Head and Neck Surgery  [facility]

## 2023-08-30 ENCOUNTER — OFFICE VISIT (OUTPATIENT)
Dept: AUDIOLOGY | Facility: CLINIC | Age: 23
End: 2023-08-30
Payer: COMMERCIAL

## 2023-08-30 ENCOUNTER — OFFICE VISIT (OUTPATIENT)
Dept: OTOLARYNGOLOGY | Facility: CLINIC | Age: 23
End: 2023-08-30
Payer: COMMERCIAL

## 2023-08-30 VITALS
BODY MASS INDEX: 32.46 KG/M2 | HEART RATE: 78 BPM | TEMPERATURE: 98.4 F | DIASTOLIC BLOOD PRESSURE: 84 MMHG | SYSTOLIC BLOOD PRESSURE: 129 MMHG | WEIGHT: 236 LBS

## 2023-08-30 DIAGNOSIS — H72.92 PERFORATION OF TYMPANIC MEMBRANE, LEFT: Primary | ICD-10-CM

## 2023-08-30 DIAGNOSIS — H72.92 PERFORATION OF TYMPANIC MEMBRANE, LEFT: ICD-10-CM

## 2023-08-30 DIAGNOSIS — F17.200 TOBACCO DEPENDENCE: ICD-10-CM

## 2023-08-30 DIAGNOSIS — H90.12 CONDUCTIVE HEARING LOSS OF LEFT EAR WITH UNRESTRICTED HEARING OF RIGHT EAR: ICD-10-CM

## 2023-08-30 DIAGNOSIS — H61.21 IMPACTED CERUMEN OF RIGHT EAR: ICD-10-CM

## 2023-08-30 DIAGNOSIS — Z98.890 HISTORY OF TYMPANOPLASTY OF LEFT EAR: ICD-10-CM

## 2023-08-30 DIAGNOSIS — Z71.6 ENCOUNTER FOR TOBACCO USE CESSATION COUNSELING: ICD-10-CM

## 2023-08-30 DIAGNOSIS — H90.12 CONDUCTIVE HEARING LOSS OF LEFT EAR WITH UNRESTRICTED HEARING OF RIGHT EAR: Primary | ICD-10-CM

## 2023-08-30 PROCEDURE — 99204 OFFICE O/P NEW MOD 45 MIN: CPT | Mod: 25 | Performed by: OTOLARYNGOLOGY

## 2023-08-30 PROCEDURE — 92550 TYMPANOMETRY & REFLEX THRESH: CPT | Performed by: AUDIOLOGIST

## 2023-08-30 PROCEDURE — 92557 COMPREHENSIVE HEARING TEST: CPT | Performed by: AUDIOLOGIST

## 2023-08-30 PROCEDURE — 92504 EAR MICROSCOPY EXAMINATION: CPT | Performed by: OTOLARYNGOLOGY

## 2023-08-30 ASSESSMENT — PAIN SCALES - GENERAL: PAINLEVEL: NO PAIN (0)

## 2023-08-30 NOTE — LETTER
8/30/2023         RE: Ilir Pal  [address] [place] [address]  [place] [address]        Dear Colleague,    Thank you for referring your patient, Ilir Pal, to the [facility]. Please see a copy of my visit note below.    Chief Complaint   Patient presents with     Ear Problem     History of perforation in left ear- did have surgery about age 12  with relief but in high school perforation noted again /audio     History of Present Illness  Ilir Pal is a 23 year old male who presents today for ear evaluation.  Has a history of ear tube placement as a child.  He had a persistent tympanic membrane perforation on the left-hand side.  He underwent tympanoplasty for closure when he was in middle school.  The patient does note that the hole was closed for some time but then apparently reoccurred.  He had a persistent hole on the left-hand side.  When he gets water in his ear, this bothers him.  He denies any significant drainage.  He does feel like the hearing is not as good on the left versus the right.  No significant dizziness, tinnitus, or vertigo.  He does have a past history of opioid dependence and does use Suboxone currently.      Past Medical History  Patient Active Problem List   Diagnosis     History of substance abuse (H)     HTN (hypertension)     Chronic fatigue     Psoriasis     Vitamin D deficiency     Current Medications    Current Outpatient Medications:      buprenorphine-naloxone (SUBOXONE) 8-2 MG SUBL sublingual tablet, PLACE 1.5 TABLET UNDER THE TONGUE TWICE DAILY, Disp: , Rfl:      buPROPion (WELLBUTRIN XL) 150 MG 24 hr tablet, Take 1 tablet by mouth daily at 2 pm, Disp: , Rfl:      clindamycin (CLEOCIN) 300 MG capsule, take 1 capsule by mouth every 8 hours, Disp: , Rfl:      clindamycin (CLINDAMAX) 1 % topical gel, Apply topically 2 times daily, Disp: , Rfl:      cloNIDine (CATAPRES) 0.2 MG tablet, Take 0.2 mg by mouth At Bedtime, Disp: , Rfl:      D 1000 25 MCG (1000  UT) CHEW, Take 2,000 Units by mouth daily, Disp: , Rfl:      metroNIDAZOLE (METROGEL) 0.75 % external gel, Apply topically 2 times daily, Disp: 45 g, Rfl: 1     mirtazapine (REMERON SOL-TAB) 30 MG ODT, Take 30 mg by mouth nightly as needed, Disp: , Rfl:      QUEtiapine (SEROQUEL) 100 MG tablet, Take 100 mg by mouth nightly as needed, Disp: , Rfl:      tretinoin (RETIN-A) 0.025 % cream, Apply topically At Bedtime Apply to affected areas., Disp: , Rfl:      triamcinolone (KENALOG) 0.5 % cream, Apply topically 2 times daily Apply to affected areas., Disp: , Rfl:      SUBOXONE 12-3 MG FILM per film, PLACE 1 FILM UNDER THE TONGUE TWICE DAILY. PLEASE SEE FULL ATTACHED DIRECTIONS (Patient not taking: Reported on 8/30/2023), Disp: , Rfl:     Allergies  Allergies   Allergen Reactions     Penicillins Rash       Social History  Social History     Socioeconomic History     Marital status: Single   Tobacco Use     Smoking status: Never     Passive exposure: Yes     Smokeless tobacco: Never     Tobacco comments:     Quit smoking: dad outside on occasion   Vaping Use     Vaping Use: Every day     Start date: 10/23/2022     Substances: Nicotine, Flavoring     Devices: Disposable   Substance and Sexual Activity     Alcohol use: No     Alcohol/week: 0.0 standard drinks of alcohol     Drug use: Unknown     Types: Other     Comment: Drug use: No   Social History Narrative    Mom has 4 children.    Ilir loves school.   Lives with parents and sibs. Involved in sports.       Family History  Family History   Problem Relation Age of Onset     Substance Abuse Mother      Coronary Artery Disease Father         MI     Diabetes Maternal Grandmother      Coronary Artery Disease Maternal Grandmother         stents     Alcoholism Maternal Grandfather      Other - See Comments Paternal Grandfather         Stroke     Diabetes Paternal Grandfather         Diabetes     Diabetes Paternal Uncle         Diabetes     Cancer Other         Cancer,PAT GT  AUNT       Review of Systems  As per HPI and PMHx, otherwise 10 system review including the head and neck, constitutional, eyes, respiratory, GI, skin, neurologic, lymphatic, endocrine, and allergy systems is negative.    Physical Exam  /84 (BP Location: Right arm, Patient Position: Chair, Cuff Size: Adult Regular)   Pulse 78   Temp 98.4  F (36.9  C) (Tympanic)   Wt 107 kg (236 lb)   BMI 32.46 kg/m    GENERAL: Patient is a pleasant, cooperative 23 year old male in no acute distress.  HEAD: Normocephalic, atraumatic.  Hair and scalp are normal.  EYES: Pupils are equal, round, reactive to light and accommodation.  Extraocular movements are intact.  The sclera nonicteric without injection.  The extraocular structures are normal.  EARS: See below.  NOSE: Nares are patent.  Nasal mucosa is pink and moist.  Negative anterior rhinoscopy.  NEUROLOGIC: Cranial nerves II through XII are grossly intact.  Voice is strong.  Patient is House-Brackmann I/VI bilaterally.  CARDIOVASCULAR: Extremities are warm and well-perfused.  No significant peripheral edema.  RESPIRATORY: Patient has nonlabored breathing without cough, wheeze, stridor.  PSYCHIATRIC: Patient is alert and oriented.  Mood and affect appear normal.  SKIN: Warm and dry.  No scalp, face, or neck lesions noted.    Physical Exam and Procedure    EARS: Normal shape and symmetry.  No tenderness when palpating the mastoid or tragal areas bilaterally.  The ears were then examined under the otic binocular microscope to perform cerumen removal.  Otoscopic exam on the right reveals impacted cerumen down to the level of the tympanic membrane.  The cerumen was removed with #5 suction, #7 suction, and alligator forceps.  The right tympanic membrane was round, intact without evidence of effusion.  No retraction, granulation, drainage, or evidence of cholesteatoma.      Attention was turned to the left ear.  Otoscopic exam on the left reveals a clear canal.  The left  tympanic membrane has a roughly 20% perforation in the posterior portion of the tympanic membrane.  The perforation is clean and dry.  He does have a slight amount of retraction and a slight myringoincudostapediopexy.  The perforation does abut the annulus.  He does have a slight amount of attic retraction.  There is no deep retraction pockets, granulation, drainage, or evidence of cholesteatoma.                 Audiogram  The patient underwent an audiogram performed today.  My review of the audiogram shows more hearing in the right ear and mild conductive hearing loss in the left ear.  Pure-tone average is 6 dB on the right and 23 dB on the left.  Speech reception threshold is 10 dB on the right and 30 dB on the left.  The patient had 100% word recognition on the right and 100% word recognition on the left.  The patient had a type A tympanogram on the right and a large volume type B tympanogram on the left.     Assessment and Plan    ICD-10-CM    1. Perforation of tympanic membrane, left  H72.92 Microscopy, Binocular     Adult Audiology  Referral      2. Conductive hearing loss of left ear with unrestricted hearing of right ear  H90.12 Microscopy, Binocular     Adult Audiology  Referral      3. History of tympanoplasty of left ear  Z98.890 Microscopy, Binocular     Adult Audiology  Referral      4. Impacted cerumen of right ear  H61.21 Microscopy, Binocular     Adult Audiology  Referral      5. Tobacco dependence  F17.200       6. Encounter for tobacco use cessation counseling  Z71.6          It was my pleasure seeing Ilir and their family today in clinic.  The patient does have a mild conductive hearing loss in the left ear and a left tympanic membrane perforation.  He has had previous tympanoplasty, however this failed.  The perforation is very posteriorly oriented and is adjacent to the annulus.  Given his previous failure, I think that we could consider an attempt at a  cartilage graft tympanoplasty.  Now that he is a bit older and his canal is bigger, I think we can do this transcanal an endoscopic approach.    We discussed the risks, benefits, alternatives, options of left cartilage graft tympanoplasty including, but not limited to: Risk of bleeding, risk of infection, risk of persistent tympanic membrane perforation, potential need for additional procedures, risk of general anesthesia.  We discussed the postoperative course and convalescence including dry ear precautions after surgery until the eardrum is healed.  The patient and patient's family understands and are willing to proceed.    The patient will follow up as necessary for worsening symptoms or changes in symptoms.     The plan was discussed in detail with the patient's family.  Questions were answered the best my ability.  They voiced understanding agree with the plan.    Andrei Moyer MD  Department of Otolaryngology-Head and Neck Surgery  [facility]       Again, thank you for allowing me to participate in the care of your patient.        Sincerely,        Andrei Moyer MD

## 2023-08-30 NOTE — PROGRESS NOTES
AUDIOLOGY REPORT:    Patient was referred to [facility] Audiology from ENT by Dr. Moyer for a hearing examination. Patient reports a history of middle ear infections as a child with PE tube placement. Patient reports that once they PE tubes extruded the hold in the left ear tympanic membrane never closed. Patient reports a failed graft to the left ear tympanic membrane. They were accompanied today by their daughter and grandmother.    Testing:    Otoscopy:   Otoscopic exam indicates Right ear deep cerumen, left ear clear     Tympanograms:    RIGHT: normal eardrum mobility     LEFT:   large ear canal volume consistent with tympanic membrane perforation    Reflexes (reported by stimulus ear): 1000 Hz  RIGHT: Ipsilateral is absent at frequencies tested  RIGHT: Contralateral is absent at frequencies tested  LEFT:   Ipsilateral is absent at frequencies tested  LEFT:   Contralateral is absent at frequencies tested    Thresholds:   Pure Tone Thresholds assessed using standard techniques audiometry with good  reliability from 250-8000 Hz bilaterally using insert earphones and circumaural headphones     RIGHT:  normal hearing sensitivity for all frequencies tested.     LEFT:    mild conductive hearing loss    NOTE: Change in transducers did not merit a change in thresholds.     Speech Reception Threshold:    RIGHT: 10 dB HL    LEFT:   30 dB HL    Word Recognition Score:     RIGHT: 100% at 50 dB HL using NU-6 recorded word list.    LEFT:   100% at 65 dB HL using NU-6 recorded word list.    Discussed results with the patient and grandmother.     Patient was returned to ENT for follow up.     Emmanuel Fan CCC-A  Licensed Audiologist  8/30/2023

## 2023-09-26 ENCOUNTER — OFFICE VISIT (OUTPATIENT)
Dept: FAMILY MEDICINE | Facility: CLINIC | Age: 23
End: 2023-09-26
Payer: COMMERCIAL

## 2023-09-26 VITALS
RESPIRATION RATE: 16 BRPM | DIASTOLIC BLOOD PRESSURE: 60 MMHG | BODY MASS INDEX: 31.07 KG/M2 | SYSTOLIC BLOOD PRESSURE: 112 MMHG | HEIGHT: 72 IN | TEMPERATURE: 97.3 F | HEART RATE: 66 BPM | OXYGEN SATURATION: 99 % | WEIGHT: 229.4 LBS

## 2023-09-26 DIAGNOSIS — L71.9 ROSACEA: Primary | ICD-10-CM

## 2023-09-26 DIAGNOSIS — L21.9 SEBORRHEIC DERMATITIS: ICD-10-CM

## 2023-09-26 DIAGNOSIS — R21 RASH AND NONSPECIFIC SKIN ERUPTION: ICD-10-CM

## 2023-09-26 DIAGNOSIS — Z76.89 SLEEP CONCERN: ICD-10-CM

## 2023-09-26 PROCEDURE — 99213 OFFICE O/P EST LOW 20 MIN: CPT | Performed by: FAMILY MEDICINE

## 2023-09-26 RX ORDER — KETOCONAZOLE 20 MG/ML
SHAMPOO TOPICAL DAILY PRN
Qty: 120 ML | Refills: 8 | Status: SHIPPED | OUTPATIENT
Start: 2023-09-26

## 2023-09-26 RX ORDER — METRONIDAZOLE 7.5 MG/G
GEL TOPICAL 2 TIMES DAILY
Qty: 45 G | Refills: 11 | Status: SHIPPED | OUTPATIENT
Start: 2023-09-26

## 2023-09-26 ASSESSMENT — PAIN SCALES - GENERAL: PAINLEVEL: NO PAIN (0)

## 2023-09-26 NOTE — PATIENT INSTRUCTIONS
Sleep Hygiene    1. Avoid doing anything stressful in the hour before bedtime. Avoid paying bills, watching politics on the news, etc.  2. Avoid screens just before bedtime. This includes cell phones, iPad, computers, TV. The bright lights on the screen is very stimulating to the brain, and makes it difficult to follow asleep and stay asleep  3. Avoid alcohol. Alcohol can sometimes make it easier to fall asleep, but significantly reduces the chance that you will stay asleep. It also impairs REM sleep, which is the good restful sleep  4. Use the bed for sleep and sex only. Avoid eating, reading, watching TV in bed  5. If you feel very restless at bedtime, try doing mundane physical activities such as vacuuming, folding laundry, etc.  6. If you find yourself making lists in your head at night, keep pen and paper at the bedside. Write down these lists. Also visualize yourself checking that item off your list and removing it from your brain.  7. Keep the room cool and dark. Consider investing in late darkening curtains  8. Avoid drinking excessive fluids just before bedtime. Empty your bladder just before bedtime  9. Cognitive behavioral therapy (CBT) has been proven to be highly effective to treat insomnia.       Attempt to change lifestyle for sleep concerns.       Refills provided for metrogel   Utilize ketoconazole shampoo as needed. Typically 2-3 times per week. Leave in hair for 5 mins then rinse out.     Follow up in 2-3 months.

## 2023-09-26 NOTE — PROGRESS NOTES
Assessment & Plan     Rosacea  Rash and nonspecific skin eruption  Symptoms improving. Continue current therapy. Dermatology referral placed in the past. Refill provided.   - metroNIDAZOLE (METROGEL) 0.75 % external gel  Dispense: 45 g; Refill: 11    Seborrheic dermatitis  Prescription provided.   - ketoconazole (NIZORAL) 2 % external shampoo  Dispense: 120 mL; Refill: 8    Sleep concern  Psychiatry prescribed Lunesta. Has not yet started and does not wish to use due to abuse potential. Discussed sleep hygiene.     The risks, benefits and treatment options of prescribed medications or other treatments have been discussed with the patient. The patient verbalized their understanding and should call or follow up if no improvement or if they develop further problems.    Follow up in 2-3 months.     Enio Valdez DO  [facility] is a 23 year old, presenting for the following health issues:  Recheck Medication (Eczema and hair  )      History of Present Illness       Reason for visit:  Fallow up on dry skin    He eats 0-1 servings of fruits and vegetables daily.He consumes 4 sweetened beverage(s) daily.He exercises with enough effort to increase his heart rate 30 to 60 minutes per day.  He exercises with enough effort to increase his heart rate 5 days per week.   He is taking medications regularly.     23 year old male who presents to clinic for follow up.     Medication Followup of  skin and hair  Taking Medication as prescribed: yes  Side Effects:  None  Medication Helping Symptoms:  yes        Rosacea   On metrogel helping. Using this twice daily.   Needs a refill.   Reports redness and irritation is improving.       Wishes to have ketoconazole shampoo for his hair dermatitis.   This has worked well for him in the past.     Sleep concerns  Prescribed lunesta from Psychiatry. Grandmother concerned about this.   Has 6 month child at home.   He has not yet started on  Lunesta due for concerns of addiction.       Review of Systems       Objective    /60 (BP Location: Left arm, Patient Position: Sitting, Cuff Size: Adult Large)   Pulse 66   Temp 97.3  F (36.3  C) (Tympanic)   Resp 16   Ht 1.829 m (6')   Wt 104.1 kg (229 lb 6.4 oz)   SpO2 99%   BMI 31.11 kg/m    Body mass index is 31.11 kg/m .  Physical Exam   General: alert, cooperative, no acute distress   CV: RRR, no murmur  Resp: non-labored breathing, clear to auscultation, no wheezing or rales   Skin: mild erythema around nasolabial folds.   Hair: area of erythema near the hairline.

## 2023-10-27 ENCOUNTER — OFFICE VISIT (OUTPATIENT)
Dept: FAMILY MEDICINE | Facility: CLINIC | Age: 23
End: 2023-10-27
Payer: COMMERCIAL

## 2023-10-27 VITALS
DIASTOLIC BLOOD PRESSURE: 80 MMHG | TEMPERATURE: 97.9 F | BODY MASS INDEX: 33.04 KG/M2 | HEIGHT: 71 IN | RESPIRATION RATE: 20 BRPM | WEIGHT: 236 LBS | SYSTOLIC BLOOD PRESSURE: 125 MMHG | HEART RATE: 74 BPM | OXYGEN SATURATION: 97 %

## 2023-10-27 DIAGNOSIS — H72.92 PERFORATION OF LEFT TYMPANIC MEMBRANE: ICD-10-CM

## 2023-10-27 DIAGNOSIS — F19.11 HISTORY OF SUBSTANCE ABUSE (H): ICD-10-CM

## 2023-10-27 DIAGNOSIS — Z01.818 PREOP GENERAL PHYSICAL EXAM: Primary | ICD-10-CM

## 2023-10-27 DIAGNOSIS — I10 HYPERTENSION, UNSPECIFIED TYPE: ICD-10-CM

## 2023-10-27 PROCEDURE — 99214 OFFICE O/P EST MOD 30 MIN: CPT | Performed by: FAMILY MEDICINE

## 2023-10-27 ASSESSMENT — PAIN SCALES - GENERAL: PAINLEVEL: NO PAIN (0)

## 2023-10-27 NOTE — H&P (VIEW-ONLY)
[facility]  [address] [address]  Phone: [phone]  Primary Provider: No Ref-Primary, Physician  Pre-op Performing Provider: TONYA SMITH      PREOPERATIVE EVALUATION:  Today's date: 10/27/2023    Ilir is a 23 year old male who presents for a preoperative evaluation.      10/27/2023    11:21 AM   Additional Questions   Roomed by Mallory KUO   Accompanied by grandmother, Zaida       Surgical Information:  Surgery/Procedure:   TYMPANOPLASTY Left General   SURGICAL PROCUREMENT, GRAFT, TRAGAL CARTILAGE       Surgery Location: [facility] OR  Surgeon: Dr. Moyer  Surgery Date: 11/03/2023  Time of Surgery: 9:30 AM  Where patient plans to recover: At home with family  Fax number for surgical facility: Note does not need to be faxed, will be available electronically in Epic.    Assessment & Plan     The proposed surgical procedure is considered INTERMEDIATE risk.    Preop general physical exam  Mets>4   No chest pain or shortness of breath at rest or with activity.       Perforation of left tympanic membrane  Scheduled for above surgery.     History of substance abuse (H)  On Suboxone. Suboxone provider is Jin Campos. Discussed he follow up with suboxone provider regarding dosing/ adjustments with upcoming procedure.     Hypertension, unspecified type  On clonidine. BP stable. Continue without modification.         - No identified additional risk factors other than previously addressed    Antiplatelet or Anticoagulation Medication Instructions:   - Patient is on no antiplatelet or anticoagulation medications.      RECOMMENDATION:  APPROVAL GIVEN to proceed with proposed procedure, without further diagnostic evaluation.    The risks, benefits and treatment options of prescribed medications or other treatments have been discussed with the patient. The patient verbalized their understanding and should call or follow up if no improvement or if they develop further problems.    Tonya  TONY Valdez, DO        Subjective       HPI related to upcoming procedure:     Perforation of TM on the left.     See. Dr. Moyer note from 8/30 for full details.          10/27/2023    11:06 AM   Preop Questions   1. Have you ever had a heart attack or stroke? No   2. Have you ever had surgery on your heart or blood vessels, such as a stent placement, a coronary artery bypass, or surgery on an artery in your head, neck, heart, or legs? No   3. Do you have chest pain with activity? No   4. Do you have a history of  heart failure? No   5. Do you currently have a cold, bronchitis or symptoms of other infection? No   6. Do you have a cough, shortness of breath, or wheezing? No   7. Do you or anyone in your family have previous history of blood clots? No   8. Do you or does anyone in your family have a serious bleeding problem such as prolonged bleeding following surgeries or cuts? No   9. Have you ever had problems with anemia or been told to take iron pills? No   10. Have you had any abnormal blood loss such as black, tarry or bloody stools? No   11. Have you ever had a blood transfusion? No   12. Are you willing to have a blood transfusion if it is medically needed before, during, or after your surgery? Yes   13. Have you or any of your relatives ever had problems with anesthesia? No   14. Do you have sleep apnea, excessive snoring or daytime drowsiness? YES - snores, day time drowsiness at times.    14a. Do you have a CPAP machine? No   15. Do you have any artifical heart valves or other implanted medical devices like a pacemaker, defibrillator, or continuous glucose monitor? No   16. Do you have artificial joints? No   17. Are you allergic to latex? No     Health Care Directive:  Patient does not have a Health Care Directive or Living Will: Discussed advance care planning with patient; however, patient declined at this time.    Preoperative Review of :   reviewed - controlled substances reflected in medication  list.      Lunesta on PDMP. Has not been taking this medication.       On suboxone due to substance abuse history.   Follow up suboxone provider regarding dosing.         Review of Systems  Constitutional, neuro, ENT, endocrine, pulmonary, cardiac, gastrointestinal, genitourinary, musculoskeletal, integument and psychiatric systems are negative, except as otherwise noted.    Patient Active Problem List    Diagnosis Date Noted    Psoriasis 07/26/2023     Priority: Medium    Vitamin D deficiency 07/26/2023     Priority: Medium    History of substance abuse (H) 06/23/2023     Priority: Medium     Meth and heroin   On suboxone       HTN (hypertension) 06/23/2023     Priority: Medium    Chronic fatigue 06/23/2023     Priority: Medium      Past Medical History:   Diagnosis Date    Amblyopia of eye     Left eye     Past Surgical History:   Procedure Laterality Date    OTHER SURGICAL HISTORY      70667.0,OK CREATE EARDRUM OPENING GEN ANESTH    OTHER SURGICAL HISTORY      11/16/2011,641911,OTHER     Current Outpatient Medications   Medication Sig Dispense Refill    buprenorphine-naloxone (SUBOXONE) 8-2 MG SUBL sublingual tablet PLACE 1.5 TABLET UNDER THE TONGUE TWICE DAILY      buPROPion (WELLBUTRIN XL) 150 MG 24 hr tablet Take 1 tablet by mouth daily at 2 pm      clindamycin (CLINDAMAX) 1 % topical gel Apply topically 2 times daily      cloNIDine (CATAPRES) 0.2 MG tablet Take 0.2 mg by mouth At Bedtime      D 1000 25 MCG (1000 UT) CHEW Take 2,000 Units by mouth daily      ketoconazole (NIZORAL) 2 % external shampoo Apply topically daily as needed for itching or irritation 120 mL 8    metroNIDAZOLE (METROGEL) 0.75 % external gel Apply topically 2 times daily 45 g 11    tretinoin (RETIN-A) 0.025 % cream Apply topically At Bedtime Apply to affected areas.      triamcinolone (KENALOG) 0.5 % cream Apply topically 2 times daily Apply to affected areas.      clindamycin (CLEOCIN) 300 MG capsule take 1 capsule by mouth every 8  "hours         Allergies   Allergen Reactions    Penicillins Rash        Social History     Tobacco Use    Smoking status: Every Day     Types: Vaping Device     Passive exposure: Yes    Smokeless tobacco: Never   Substance Use Topics    Alcohol use: No     Alcohol/week: 0.0 standard drinks of alcohol       History   Drug use: Unknown    Types: Other     Comment: Drug use: No         Objective     /80 (BP Location: Right arm, Patient Position: Chair, Cuff Size: Adult Regular)   Pulse 74   Temp 97.9  F (36.6  C) (Tympanic)   Resp 20   Ht 1.803 m (5' 11\")   Wt 107 kg (236 lb)   SpO2 97%   BMI 32.92 kg/m      Physical Exam    GENERAL APPEARANCE: healthy, alert and no distress     EYES: EOMI,  PERRL     HENT: ear canal normal. TM perforated on the left.      NECK: no adenopathy, no asymmetry, masses, or scars and thyroid normal to palpation     RESP: lungs clear to auscultation - no rales, rhonchi or wheezes     CV: regular rates and rhythm, normal S1 S2, no S3 or S4 and no murmur, click or rub     ABDOMEN:  soft, nontender, no HSM or masses and bowel sounds normal     MS: extremities normal- no gross deformities noted, no evidence of inflammation in joints, FROM in all extremities.     SKIN: no suspicious lesions or rashes     NEURO: Normal strength and tone, sensory exam grossly normal, mentation intact and speech normal     PSYCH: mentation appears normal. and affect normal/bright     LYMPHATICS: No cervical adenopathy    Recent Labs   Lab Test 06/23/23  1036   HGB 14.0         POTASSIUM 4.3   CR 0.71   A1C 5.1        Diagnostics:  No labs were ordered during this visit.   No EKG required, no history of coronary heart disease, significant arrhythmia, peripheral arterial disease or other structural heart disease.    Revised Cardiac Risk Index (RCRI):  The patient has the following serious cardiovascular risks for perioperative complications:   - No serious cardiac risks = 0 points     RCRI " Interpretation: 0 points: Class I (very low risk - 0.4% complication rate)         Signed Electronically by: Enio Valdez DO  Copy of this evaluation report is provided to requesting physician.

## 2023-10-27 NOTE — PROGRESS NOTES
[facility]  [address] [address]  Phone: [phone]  Primary Provider: No Ref-Primary, Physician  Pre-op Performing Provider: TONYA SMITH      PREOPERATIVE EVALUATION:  Today's date: 10/27/2023    Ilir is a 23 year old male who presents for a preoperative evaluation.      10/27/2023    11:21 AM   Additional Questions   Roomed by Mallory KUO   Accompanied by grandmother, Zaida       Surgical Information:  Surgery/Procedure:   TYMPANOPLASTY Left General   SURGICAL PROCUREMENT, GRAFT, TRAGAL CARTILAGE       Surgery Location: [facility] OR  Surgeon: Dr. Moyer  Surgery Date: 11/03/2023  Time of Surgery: 9:30 AM  Where patient plans to recover: At home with family  Fax number for surgical facility: Note does not need to be faxed, will be available electronically in Epic.    Assessment & Plan     The proposed surgical procedure is considered INTERMEDIATE risk.    Preop general physical exam  Mets>4   No chest pain or shortness of breath at rest or with activity.       Perforation of left tympanic membrane  Scheduled for above surgery.     History of substance abuse (H)  On Suboxone. Suboxone provider is Jin Campos. Discussed he follow up with suboxone provider regarding dosing/ adjustments with upcoming procedure.     Hypertension, unspecified type  On clonidine. BP stable. Continue without modification.         - No identified additional risk factors other than previously addressed    Antiplatelet or Anticoagulation Medication Instructions:   - Patient is on no antiplatelet or anticoagulation medications.      RECOMMENDATION:  APPROVAL GIVEN to proceed with proposed procedure, without further diagnostic evaluation.    The risks, benefits and treatment options of prescribed medications or other treatments have been discussed with the patient. The patient verbalized their understanding and should call or follow up if no improvement or if they develop further problems.    Tonya  TONY Valdez, DO        Subjective       HPI related to upcoming procedure:     Perforation of TM on the left.     See. Dr. Moyer note from 8/30 for full details.          10/27/2023    11:06 AM   Preop Questions   1. Have you ever had a heart attack or stroke? No   2. Have you ever had surgery on your heart or blood vessels, such as a stent placement, a coronary artery bypass, or surgery on an artery in your head, neck, heart, or legs? No   3. Do you have chest pain with activity? No   4. Do you have a history of  heart failure? No   5. Do you currently have a cold, bronchitis or symptoms of other infection? No   6. Do you have a cough, shortness of breath, or wheezing? No   7. Do you or anyone in your family have previous history of blood clots? No   8. Do you or does anyone in your family have a serious bleeding problem such as prolonged bleeding following surgeries or cuts? No   9. Have you ever had problems with anemia or been told to take iron pills? No   10. Have you had any abnormal blood loss such as black, tarry or bloody stools? No   11. Have you ever had a blood transfusion? No   12. Are you willing to have a blood transfusion if it is medically needed before, during, or after your surgery? Yes   13. Have you or any of your relatives ever had problems with anesthesia? No   14. Do you have sleep apnea, excessive snoring or daytime drowsiness? YES - snores, day time drowsiness at times.    14a. Do you have a CPAP machine? No   15. Do you have any artifical heart valves or other implanted medical devices like a pacemaker, defibrillator, or continuous glucose monitor? No   16. Do you have artificial joints? No   17. Are you allergic to latex? No     Health Care Directive:  Patient does not have a Health Care Directive or Living Will: Discussed advance care planning with patient; however, patient declined at this time.    Preoperative Review of :   reviewed - controlled substances reflected in medication  list.      [place] on PDMP. Has not been taking this medication.       On suboxone due to substance abuse history.   Follow up suboxone provider regarding dosing.         Review of Systems  Constitutional, neuro, ENT, endocrine, pulmonary, cardiac, gastrointestinal, genitourinary, musculoskeletal, integument and psychiatric systems are negative, except as otherwise noted.    Patient Active Problem List    Diagnosis Date Noted    Psoriasis 07/26/2023     Priority: Medium    Vitamin D deficiency 07/26/2023     Priority: Medium    History of substance abuse (H) 06/23/2023     Priority: Medium     Meth and heroin   On suboxone       HTN (hypertension) 06/23/2023     Priority: Medium    Chronic fatigue 06/23/2023     Priority: Medium      Past Medical History:   Diagnosis Date    Amblyopia of eye     Left eye     Past Surgical History:   Procedure Laterality Date    OTHER SURGICAL HISTORY      10949.0,IL CREATE EARDRUM OPENING GEN ANESTH    OTHER SURGICAL HISTORY      11/16/2011,913287,OTHER     Current Outpatient Medications   Medication Sig Dispense Refill    buprenorphine-naloxone (SUBOXONE) 8-2 MG SUBL sublingual tablet PLACE 1.5 TABLET UNDER THE TONGUE TWICE DAILY      buPROPion (WELLBUTRIN XL) 150 MG 24 hr tablet Take 1 tablet by mouth daily at 2 pm      clindamycin (CLINDAMAX) 1 % topical gel Apply topically 2 times daily      cloNIDine (CATAPRES) 0.2 MG tablet Take 0.2 mg by mouth At Bedtime      D 1000 25 MCG (1000 UT) CHEW Take 2,000 Units by mouth daily      ketoconazole (NIZORAL) 2 % external shampoo Apply topically daily as needed for itching or irritation 120 mL 8    metroNIDAZOLE (METROGEL) 0.75 % external gel Apply topically 2 times daily 45 g 11    tretinoin (RETIN-A) 0.025 % cream Apply topically At Bedtime Apply to affected areas.      triamcinolone (KENALOG) 0.5 % cream Apply topically 2 times daily Apply to affected areas.      clindamycin (CLEOCIN) 300 MG capsule take 1 capsule by mouth every 8  "hours         Allergies   Allergen Reactions    Penicillins Rash        Social History     Tobacco Use    Smoking status: Every Day     Types: Vaping Device     Passive exposure: Yes    Smokeless tobacco: Never   Substance Use Topics    Alcohol use: No     Alcohol/week: 0.0 standard drinks of alcohol       History   Drug use: Unknown    Types: Other     Comment: Drug use: No         Objective     /80 (BP Location: Right arm, Patient Position: Chair, Cuff Size: Adult Regular)   Pulse 74   Temp 97.9  F (36.6  C) (Tympanic)   Resp 20   Ht 1.803 m (5' 11\")   Wt 107 kg (236 lb)   SpO2 97%   BMI 32.92 kg/m      Physical Exam    GENERAL APPEARANCE: healthy, alert and no distress     EYES: EOMI,  PERRL     HENT: ear canal normal. TM perforated on the left.      NECK: no adenopathy, no asymmetry, masses, or scars and thyroid normal to palpation     RESP: lungs clear to auscultation - no rales, rhonchi or wheezes     CV: regular rates and rhythm, normal S1 S2, no S3 or S4 and no murmur, click or rub     ABDOMEN:  soft, nontender, no HSM or masses and bowel sounds normal     MS: extremities normal- no gross deformities noted, no evidence of inflammation in joints, FROM in all extremities.     SKIN: no suspicious lesions or rashes     NEURO: Normal strength and tone, sensory exam grossly normal, mentation intact and speech normal     PSYCH: mentation appears normal. and affect normal/bright     LYMPHATICS: No cervical adenopathy    Recent Labs   Lab Test 06/23/23  1036   HGB 14.0         POTASSIUM 4.3   CR 0.71   A1C 5.1        Diagnostics:  No labs were ordered during this visit.   No EKG required, no history of coronary heart disease, significant arrhythmia, peripheral arterial disease or other structural heart disease.    Revised Cardiac Risk Index (RCRI):  The patient has the following serious cardiovascular risks for perioperative complications:   - No serious cardiac risks = 0 points     RCRI " Interpretation: 0 points: Class I (very low risk - 0.4% complication rate)         Signed Electronically by: Enio Valdez DO  Copy of this evaluation report is provided to requesting physician.

## 2023-10-27 NOTE — PATIENT INSTRUCTIONS
Follow up with suboxone provider.       Preparing for Your Surgery  Getting started  A nurse will call you to review your health history and instructions. They will give you an arrival time based on your scheduled surgery time. Please be ready to share:  Your doctor's clinic name and phone number  Your medical, surgical, and anesthesia history  A list of allergies and sensitivities  A list of medicines, including herbal treatments and over-the-counter drugs  Whether the patient has a legal guardian (ask how to send us the papers in advance)  Please tell us if you're pregnant--or if there's any chance you might be pregnant. Some surgeries may injure a fetus (unborn baby), so they require a pregnancy test. Surgeries that are safe for a fetus don't always need a test, and you can choose whether to have one.   If you have a child who's having surgery, please ask for a copy of Preparing for Your Child's Surgery.    Preparing for surgery  Within 10 to 30 days of surgery: Have a pre-op exam (sometimes called an H&P, or History and Physical). This can be done at a clinic or pre-operative center.  If you're having a , you may not need this exam. Talk to your care team.  At your pre-op exam, talk to your care team about all medicines you take. If you need to stop any medicines before surgery, ask when to start taking them again.  We do this for your safety. Many medicines can make you bleed too much during surgery. Some change how well surgery (anesthesia) drugs work.  Call your insurance company to let them know you're having surgery. (If you don't have insurance, call [phone].)  Call your clinic if there's any change in your health. This includes signs of a cold or flu (sore throat, runny nose, cough, rash, fever). It also includes a scrape or scratch near the surgery site.  If you have questions on the day of surgery, call your hospital or surgery center.  Eating and drinking guidelines  For your safety:  Unless your surgeon tells you otherwise, follow the guidelines below.  Eat and drink as usual until 8 hours before you arrive for surgery. After that, no food or milk.  Drink clear liquids until 2 hours before you arrive. These are liquids you can see through, like water, Gatorade, and Propel Water. They also include plain black coffee and tea (no cream or milk), candy, and breath mints. You can spit out gum when you arrive.  If you drink alcohol: Stop drinking it the night before surgery.  If your care team tells you to take medicine on the morning of surgery, it's okay to take it with a sip of water.  Preventing infection  Shower or bathe the night before and morning of your surgery. Follow the instructions your clinic gave you. (If no instructions, use regular soap.)  Don't shave or clip hair near your surgery site. We'll remove the hair if needed.  Don't smoke or vape the morning of surgery. You may chew nicotine gum up to 2 hours before surgery. A nicotine patch is okay.  Note: Some surgeries require you to completely quit smoking and nicotine. Check with your surgeon.  Your care team will make every effort to keep you safe from infection. We will:  Clean our hands often with soap and water (or an alcohol-based hand rub).  Clean the skin at your surgery site with a special soap that kills germs.  Give you a special gown to keep you warm. (Cold raises the risk of infection.)  Wear special hair covers, masks, gowns and gloves during surgery.  Give antibiotic medicine, if prescribed. Not all surgeries need antibiotics.  What to bring on the day of surgery  Photo ID and insurance card  Copy of your health care directive, if you have one  Glasses and hearing aids (bring cases)  You can't wear contacts during surgery  Inhaler and eye drops, if you use them (tell us about these when you arrive)  CPAP machine or breathing device, if you use them  A few personal items, if spending the night  If you have . . .  A  pacemaker, ICD (cardiac defibrillator) or other implant: Bring the ID card.  An implanted stimulator: Bring the remote control.  A legal guardian: Bring a copy of the certified (court-stamped) guardianship papers.  Please remove any jewelry, including body piercings. Leave jewelry and other valuables at home.  If you're going home the day of surgery  You must have a responsible adult drive you home. They should stay with you overnight as well.  If you don't have someone to stay with you, and you aren't safe to go home alone, we may keep you overnight. Insurance often won't pay for this.  After surgery  If it's hard to control your pain or you need more pain medicine, please call your surgeon's office.  Questions?   If you have any questions for your care team, list them here: _________________________________________________________________________________________________________________________________________________________________________ ____________________________________ ____________________________________ ____________________________________  For informational purposes only. Not to replace the advice of your health care provider. Copyright   2003, 2019 [employer]. All rights reserved. Clinically reviewed by Felecia Aranda MD. [employer] 919882 - REV 12/22.    How to Take Your Medication Before Surgery    Follow up with suboxone provider.

## 2023-11-02 ENCOUNTER — ANESTHESIA EVENT (OUTPATIENT)
Dept: SURGERY | Facility: CLINIC | Age: 23
End: 2023-11-02
Payer: COMMERCIAL

## 2023-11-02 NOTE — ANESTHESIA PREPROCEDURE EVALUATION
Anesthesia Pre-Procedure Evaluation    Patient: Ilir Pal   MRN: [id number] : 2000        Procedure : Procedure(s):  TYMPANOPLASTY  SURGICAL PROCUREMENT, GRAFT, TRAGAL CARTILAGE          Past Medical History:   Diagnosis Date    Amblyopia of eye     Left eye      Past Surgical History:   Procedure Laterality Date    OTHER SURGICAL HISTORY      35692.0,NE CREATE EARDRUM OPENING GEN ANESTH    OTHER SURGICAL HISTORY      2011,[id number],OTHER      Allergies   Allergen Reactions    Penicillins Rash      Social History     Tobacco Use    Smoking status: Every Day     Types: Vaping Device     Passive exposure: Yes    Smokeless tobacco: Never   Substance Use Topics    Alcohol use: No     Alcohol/week: 0.0 standard drinks of alcohol      Wt Readings from Last 1 Encounters:   10/27/23 107 kg (236 lb)        Anesthesia Evaluation            ROS/MED HX  ENT/Pulmonary:     (+)     JOSELO risk factors, snores loudly,   daytime somnolence,       tobacco use, Current use,                      Neurologic:       Cardiovascular:     (+)  hypertension- -   -  - -                                      METS/Exercise Tolerance:     Hematologic:       Musculoskeletal:       GI/Hepatic:       Renal/Genitourinary:       Endo:       Psychiatric/Substance Use: Comment: Hx of substance abuse  On suboxone    (+)     Recreational drug usage: Other (Comment).    Infectious Disease:       Malignancy:       Other:            Physical Exam    Airway  airway exam normal      Mallampati: I   TM distance: > 3 FB   Neck ROM: full   Mouth opening: > 3 cm    Respiratory Devices and Support         Dental       (+) Multiple visibly decayed, broken teeth      Cardiovascular   cardiovascular exam normal          Pulmonary   pulmonary exam normal                OUTSIDE LABS:  CBC:   Lab Results   Component Value Date    WBC 5.7 2023    WBC 6.9 2020    HGB 14.0 2023    HGB 14.0 2020    HCT 41.3 2023    HCT 40.4  "06/28/2020     06/23/2023     06/28/2020     BMP:   Lab Results   Component Value Date     06/23/2023     06/28/2020    POTASSIUM 4.3 06/23/2023    POTASSIUM 3.5 06/28/2020    CHLORIDE 105 06/23/2023    CHLORIDE 107 06/28/2020    CO2 24 06/23/2023    CO2 28 06/28/2020    BUN 17.0 06/23/2023    BUN 6 (L) 06/28/2020    CR 0.71 06/23/2023    CR 0.86 06/28/2020    GLC 77 06/23/2023    GLC 94 06/28/2020     COAGS: No results found for: \"PTT\", \"INR\", \"FIBR\"  POC: No results found for: \"BGM\", \"HCG\", \"HCGS\"  HEPATIC:   Lab Results   Component Value Date    ALBUMIN 4.8 06/23/2023    PROTTOTAL 7.4 06/23/2023    ALT 39 06/23/2023    AST 30 06/23/2023    ALKPHOS 112 06/23/2023    BILITOTAL 0.5 06/23/2023     OTHER:   Lab Results   Component Value Date    A1C 5.1 06/23/2023    ROSALBA 9.5 06/23/2023    TSH 2.00 06/23/2023       Anesthesia Plan    ASA Status:  3    NPO Status:  NPO Appropriate    Anesthesia Type: General.     - Airway: ETT   Induction: Intravenous.   Maintenance: Balanced.        Consents    Anesthesia Plan(s) and associated risks, benefits, and realistic alternatives discussed. Questions answered and patient/representative(s) expressed understanding.     - Discussed:     - Discussed with:  Patient      - Extended Intubation/Ventilatory Support Discussed: No.      - Patient is DNR/DNI Status: No     Use of blood products discussed: No .     Postoperative Care    Pain management: IV analgesics.   PONV prophylaxis: Ondansetron (or other 5HT-3), Dexamethasone or Solumedrol     Comments:                NASH Lombardo CRNA  "

## 2023-11-03 ENCOUNTER — ANESTHESIA (OUTPATIENT)
Dept: SURGERY | Facility: CLINIC | Age: 23
End: 2023-11-03
Payer: COMMERCIAL

## 2023-11-03 ENCOUNTER — HOSPITAL ENCOUNTER (OUTPATIENT)
Facility: CLINIC | Age: 23
Discharge: HOME | End: 2023-11-03
Attending: OTOLARYNGOLOGY | Admitting: OTOLARYNGOLOGY
Payer: COMMERCIAL

## 2023-11-03 VITALS
OXYGEN SATURATION: 95 % | RESPIRATION RATE: 16 BRPM | TEMPERATURE: 97.8 F | DIASTOLIC BLOOD PRESSURE: 76 MMHG | BODY MASS INDEX: 33.04 KG/M2 | HEIGHT: 71 IN | HEART RATE: 91 BPM | WEIGHT: 236 LBS | SYSTOLIC BLOOD PRESSURE: 128 MMHG

## 2023-11-03 DIAGNOSIS — H72.92 PERFORATION OF LEFT TYMPANIC MEMBRANE: Primary | ICD-10-CM

## 2023-11-03 PROCEDURE — 710N000012 HC RECOVERY PHASE 2, PER MINUTE: Performed by: OTOLARYNGOLOGY

## 2023-11-03 PROCEDURE — 250N000011 HC RX IP 250 OP 636: Performed by: NURSE ANESTHETIST, CERTIFIED REGISTERED

## 2023-11-03 PROCEDURE — 250N000025 HC SEVOFLURANE, PER MIN: Performed by: OTOLARYNGOLOGY

## 2023-11-03 PROCEDURE — 250N000009 HC RX 250: Performed by: OTOLARYNGOLOGY

## 2023-11-03 PROCEDURE — 250N000013 HC RX MED GY IP 250 OP 250 PS 637: Performed by: OTOLARYNGOLOGY

## 2023-11-03 PROCEDURE — 258N000003 HC RX IP 258 OP 636: Performed by: NURSE ANESTHETIST, CERTIFIED REGISTERED

## 2023-11-03 PROCEDURE — 258N000003 HC RX IP 258 OP 636

## 2023-11-03 PROCEDURE — 250N000011 HC RX IP 250 OP 636: Performed by: OTOLARYNGOLOGY

## 2023-11-03 PROCEDURE — 272N000001 HC OR GENERAL SUPPLY STERILE: Performed by: OTOLARYNGOLOGY

## 2023-11-03 PROCEDURE — 370N000017 HC ANESTHESIA TECHNICAL FEE, PER MIN: Performed by: OTOLARYNGOLOGY

## 2023-11-03 PROCEDURE — 250N000013 HC RX MED GY IP 250 OP 250 PS 637

## 2023-11-03 PROCEDURE — 999N000141 HC STATISTIC PRE-PROCEDURE NURSING ASSESSMENT: Performed by: OTOLARYNGOLOGY

## 2023-11-03 PROCEDURE — 710N000009 HC RECOVERY PHASE 1, LEVEL 1, PER MIN: Performed by: OTOLARYNGOLOGY

## 2023-11-03 PROCEDURE — 250N000009 HC RX 250

## 2023-11-03 PROCEDURE — 250N000009 HC RX 250: Performed by: NURSE ANESTHETIST, CERTIFIED REGISTERED

## 2023-11-03 PROCEDURE — 360N000078 HC SURGERY LEVEL 5, PER MIN: Performed by: OTOLARYNGOLOGY

## 2023-11-03 RX ORDER — FENTANYL CITRATE 50 UG/ML
25 INJECTION, SOLUTION INTRAMUSCULAR; INTRAVENOUS EVERY 5 MIN PRN
Status: DISCONTINUED | OUTPATIENT
Start: 2023-11-03 | End: 2023-11-03 | Stop reason: HOSPADM

## 2023-11-03 RX ORDER — LIDOCAINE HYDROCHLORIDE AND EPINEPHRINE 10; 10 MG/ML; UG/ML
INJECTION, SOLUTION INFILTRATION; PERINEURAL PRN
Status: DISCONTINUED | OUTPATIENT
Start: 2023-11-03 | End: 2023-11-03 | Stop reason: HOSPADM

## 2023-11-03 RX ORDER — NALOXONE HYDROCHLORIDE 0.4 MG/ML
0.4 INJECTION, SOLUTION INTRAMUSCULAR; INTRAVENOUS; SUBCUTANEOUS
Status: DISCONTINUED | OUTPATIENT
Start: 2023-11-03 | End: 2023-11-03 | Stop reason: HOSPADM

## 2023-11-03 RX ORDER — NALOXONE HYDROCHLORIDE 0.4 MG/ML
0.2 INJECTION, SOLUTION INTRAMUSCULAR; INTRAVENOUS; SUBCUTANEOUS
Status: DISCONTINUED | OUTPATIENT
Start: 2023-11-03 | End: 2023-11-03 | Stop reason: HOSPADM

## 2023-11-03 RX ORDER — SODIUM CHLORIDE, SODIUM LACTATE, POTASSIUM CHLORIDE, CALCIUM CHLORIDE 600; 310; 30; 20 MG/100ML; MG/100ML; MG/100ML; MG/100ML
INJECTION, SOLUTION INTRAVENOUS CONTINUOUS
Status: DISCONTINUED | OUTPATIENT
Start: 2023-11-03 | End: 2023-11-03 | Stop reason: HOSPADM

## 2023-11-03 RX ORDER — KETAMINE HYDROCHLORIDE 10 MG/ML
INJECTION INTRAMUSCULAR; INTRAVENOUS PRN
Status: DISCONTINUED | OUTPATIENT
Start: 2023-11-03 | End: 2023-11-03

## 2023-11-03 RX ORDER — MEPERIDINE HYDROCHLORIDE 25 MG/ML
12.5 INJECTION INTRAMUSCULAR; INTRAVENOUS; SUBCUTANEOUS EVERY 5 MIN PRN
Status: DISCONTINUED | OUTPATIENT
Start: 2023-11-03 | End: 2023-11-03 | Stop reason: HOSPADM

## 2023-11-03 RX ORDER — FENTANYL CITRATE 50 UG/ML
50 INJECTION, SOLUTION INTRAMUSCULAR; INTRAVENOUS EVERY 5 MIN PRN
Status: DISCONTINUED | OUTPATIENT
Start: 2023-11-03 | End: 2023-11-03 | Stop reason: HOSPADM

## 2023-11-03 RX ORDER — DIMENHYDRINATE 50 MG/ML
25 INJECTION, SOLUTION INTRAMUSCULAR; INTRAVENOUS
Status: DISCONTINUED | OUTPATIENT
Start: 2023-11-03 | End: 2023-11-03 | Stop reason: HOSPADM

## 2023-11-03 RX ORDER — ACETAMINOPHEN 325 MG/1
975 TABLET ORAL ONCE
Status: DISCONTINUED | OUTPATIENT
Start: 2023-11-03 | End: 2023-11-03

## 2023-11-03 RX ORDER — PROPOFOL 10 MG/ML
INJECTION, EMULSION INTRAVENOUS PRN
Status: DISCONTINUED | OUTPATIENT
Start: 2023-11-03 | End: 2023-11-03

## 2023-11-03 RX ORDER — ACETAMINOPHEN 500 MG
1000 TABLET ORAL EVERY 6 HOURS PRN
Qty: 200 TABLET | Refills: 1 | Status: SHIPPED | OUTPATIENT
Start: 2023-11-03 | End: 2023-11-13

## 2023-11-03 RX ORDER — IBUPROFEN 200 MG
600 TABLET ORAL
Status: DISCONTINUED | OUTPATIENT
Start: 2023-11-03 | End: 2023-11-03 | Stop reason: HOSPADM

## 2023-11-03 RX ORDER — ONDANSETRON 2 MG/ML
INJECTION INTRAMUSCULAR; INTRAVENOUS PRN
Status: DISCONTINUED | OUTPATIENT
Start: 2023-11-03 | End: 2023-11-03

## 2023-11-03 RX ORDER — MAGNESIUM SULFATE HEPTAHYDRATE 40 MG/ML
INJECTION, SOLUTION INTRAVENOUS PRN
Status: DISCONTINUED | OUTPATIENT
Start: 2023-11-03 | End: 2023-11-03

## 2023-11-03 RX ORDER — HYDROMORPHONE HCL IN WATER/PF 6 MG/30 ML
0.4 PATIENT CONTROLLED ANALGESIA SYRINGE INTRAVENOUS EVERY 5 MIN PRN
Status: DISCONTINUED | OUTPATIENT
Start: 2023-11-03 | End: 2023-11-03 | Stop reason: HOSPADM

## 2023-11-03 RX ORDER — HYDRALAZINE HYDROCHLORIDE 20 MG/ML
2.5-5 INJECTION INTRAMUSCULAR; INTRAVENOUS EVERY 10 MIN PRN
Status: DISCONTINUED | OUTPATIENT
Start: 2023-11-03 | End: 2023-11-03 | Stop reason: HOSPADM

## 2023-11-03 RX ORDER — KETOROLAC TROMETHAMINE 30 MG/ML
INJECTION, SOLUTION INTRAMUSCULAR; INTRAVENOUS PRN
Status: DISCONTINUED | OUTPATIENT
Start: 2023-11-03 | End: 2023-11-03

## 2023-11-03 RX ORDER — DEXAMETHASONE SODIUM PHOSPHATE 4 MG/ML
INJECTION, SOLUTION INTRA-ARTICULAR; INTRALESIONAL; INTRAMUSCULAR; INTRAVENOUS; SOFT TISSUE PRN
Status: DISCONTINUED | OUTPATIENT
Start: 2023-11-03 | End: 2023-11-03

## 2023-11-03 RX ORDER — HYDROXYZINE HYDROCHLORIDE 25 MG/1
25 TABLET, FILM COATED ORAL EVERY 6 HOURS PRN
Status: DISCONTINUED | OUTPATIENT
Start: 2023-11-03 | End: 2023-11-03 | Stop reason: HOSPADM

## 2023-11-03 RX ORDER — ACETAMINOPHEN 325 MG/1
975 TABLET ORAL ONCE
Status: COMPLETED | OUTPATIENT
Start: 2023-11-03 | End: 2023-11-03

## 2023-11-03 RX ORDER — CEFAZOLIN SODIUM/WATER 2 G/20 ML
2 SYRINGE (ML) INTRAVENOUS
Status: COMPLETED | OUTPATIENT
Start: 2023-11-03 | End: 2023-11-03

## 2023-11-03 RX ORDER — CIPROFLOXACIN AND DEXAMETHASONE 3; 1 MG/ML; MG/ML
SUSPENSION/ DROPS AURICULAR (OTIC)
Qty: 10 ML | Refills: 3 | Status: SHIPPED | OUTPATIENT
Start: 2023-11-03

## 2023-11-03 RX ORDER — IBUPROFEN 200 MG
600 TABLET ORAL EVERY 6 HOURS PRN
Qty: 200 TABLET | Refills: 1 | Status: SHIPPED | OUTPATIENT
Start: 2023-11-03 | End: 2023-11-13

## 2023-11-03 RX ORDER — FENTANYL CITRATE 50 UG/ML
INJECTION, SOLUTION INTRAMUSCULAR; INTRAVENOUS PRN
Status: DISCONTINUED | OUTPATIENT
Start: 2023-11-03 | End: 2023-11-03

## 2023-11-03 RX ORDER — LIDOCAINE 40 MG/G
CREAM TOPICAL
Status: DISCONTINUED | OUTPATIENT
Start: 2023-11-03 | End: 2023-11-03 | Stop reason: HOSPADM

## 2023-11-03 RX ORDER — CEFAZOLIN SODIUM/WATER 2 G/20 ML
2 SYRINGE (ML) INTRAVENOUS SEE ADMIN INSTRUCTIONS
Status: DISCONTINUED | OUTPATIENT
Start: 2023-11-03 | End: 2023-11-03 | Stop reason: HOSPADM

## 2023-11-03 RX ORDER — GABAPENTIN 300 MG/1
300 CAPSULE ORAL
Status: COMPLETED | OUTPATIENT
Start: 2023-11-03 | End: 2023-11-03

## 2023-11-03 RX ORDER — ONDANSETRON 4 MG/1
4 TABLET, ORALLY DISINTEGRATING ORAL EVERY 30 MIN PRN
Status: DISCONTINUED | OUTPATIENT
Start: 2023-11-03 | End: 2023-11-03 | Stop reason: HOSPADM

## 2023-11-03 RX ORDER — HYDROMORPHONE HCL IN WATER/PF 6 MG/30 ML
0.2 PATIENT CONTROLLED ANALGESIA SYRINGE INTRAVENOUS EVERY 5 MIN PRN
Status: DISCONTINUED | OUTPATIENT
Start: 2023-11-03 | End: 2023-11-03 | Stop reason: HOSPADM

## 2023-11-03 RX ORDER — ONDANSETRON 4 MG/1
4 TABLET, ORALLY DISINTEGRATING ORAL
Status: DISCONTINUED | OUTPATIENT
Start: 2023-11-03 | End: 2023-11-03 | Stop reason: HOSPADM

## 2023-11-03 RX ORDER — ONDANSETRON 2 MG/ML
4 INJECTION INTRAMUSCULAR; INTRAVENOUS EVERY 30 MIN PRN
Status: DISCONTINUED | OUTPATIENT
Start: 2023-11-03 | End: 2023-11-03 | Stop reason: HOSPADM

## 2023-11-03 RX ADMIN — SODIUM CHLORIDE, POTASSIUM CHLORIDE, SODIUM LACTATE AND CALCIUM CHLORIDE: 600; 310; 30; 20 INJECTION, SOLUTION INTRAVENOUS at 11:08

## 2023-11-03 RX ADMIN — SODIUM CHLORIDE, POTASSIUM CHLORIDE, SODIUM LACTATE AND CALCIUM CHLORIDE: 600; 310; 30; 20 INJECTION, SOLUTION INTRAVENOUS at 08:50

## 2023-11-03 RX ADMIN — SODIUM CHLORIDE, POTASSIUM CHLORIDE, SODIUM LACTATE AND CALCIUM CHLORIDE: 600; 310; 30; 20 INJECTION, SOLUTION INTRAVENOUS at 09:42

## 2023-11-03 RX ADMIN — ONDANSETRON 4 MG: 2 INJECTION INTRAMUSCULAR; INTRAVENOUS at 10:59

## 2023-11-03 RX ADMIN — DEXAMETHASONE SODIUM PHOSPHATE 4 MG: 4 INJECTION, SOLUTION INTRA-ARTICULAR; INTRALESIONAL; INTRAMUSCULAR; INTRAVENOUS; SOFT TISSUE at 09:54

## 2023-11-03 RX ADMIN — ROCURONIUM BROMIDE 50 MG: 50 INJECTION, SOLUTION INTRAVENOUS at 09:49

## 2023-11-03 RX ADMIN — PROPOFOL 200 MG: 10 INJECTION, EMULSION INTRAVENOUS at 09:49

## 2023-11-03 RX ADMIN — SUGAMMADEX 200 MG: 100 INJECTION, SOLUTION INTRAVENOUS at 11:06

## 2023-11-03 RX ADMIN — FENTANYL CITRATE 100 MCG: 50 INJECTION INTRAMUSCULAR; INTRAVENOUS at 09:49

## 2023-11-03 RX ADMIN — KETAMINE HYDROCHLORIDE 25 MG: 10 INJECTION INTRAMUSCULAR; INTRAVENOUS at 09:55

## 2023-11-03 RX ADMIN — MAGNESIUM SULFATE HEPTAHYDRATE 2 G: 40 INJECTION, SOLUTION INTRAVENOUS at 10:00

## 2023-11-03 RX ADMIN — Medication 2 G: at 09:41

## 2023-11-03 RX ADMIN — PHENYLEPHRINE HYDROCHLORIDE 100 MCG: 10 INJECTION INTRAVENOUS at 10:49

## 2023-11-03 RX ADMIN — MIDAZOLAM 2 MG: 1 INJECTION INTRAMUSCULAR; INTRAVENOUS at 09:40

## 2023-11-03 RX ADMIN — KETOROLAC TROMETHAMINE 15 MG: 30 INJECTION, SOLUTION INTRAMUSCULAR at 10:59

## 2023-11-03 RX ADMIN — LIDOCAINE HYDROCHLORIDE 100 MG: 10 INJECTION, SOLUTION INFILTRATION; PERINEURAL at 09:49

## 2023-11-03 RX ADMIN — GABAPENTIN 300 MG: 300 CAPSULE ORAL at 08:44

## 2023-11-03 RX ADMIN — KETAMINE HYDROCHLORIDE 25 MG: 10 INJECTION INTRAMUSCULAR; INTRAVENOUS at 10:40

## 2023-11-03 RX ADMIN — ACETAMINOPHEN 975 MG: 325 TABLET, FILM COATED ORAL at 08:44

## 2023-11-03 ASSESSMENT — ACTIVITIES OF DAILY LIVING (ADL)
ADLS_ACUITY_SCORE: 35

## 2023-11-03 ASSESSMENT — LIFESTYLE VARIABLES: TOBACCO_USE: 1

## 2023-11-03 NOTE — OP NOTE
PREOPERATIVE DIAGNOSES: Multiple left tympanic membrane perforations.     POSTOPERATIVE DIAGNOSES: Same.      PROCEDURE PERFORMED:   1.  Left type I tympanoplasty using tragal cartilage  2.  Microsurgical techniques using the operating microscope     SURGEON: Andrei Moyer MD      ASSISTANTS: None.     BLOOD LOSS: 5 mL.      COMPLICATIONS: None.      SPECIMENS: None.     ANESTHESIA: General.     GRAFTS, IMPLANTS, DRAINS: One anterior and one posterior butterfly tragal cartilage graft to left tympanic membrane .     INDICATIONS: Prevent complications, treat disease.     FINDINGS:   Total of two left tympanic membrane perforations  Left anterior tympanic membrane perforation. measuring roughly 10% of the tympanic membrane.  Left posterior tympanic membrane perforation. measuring roughly 10% of the tympanic membrane.   The middle ear is clean and dry.         OPERATIVE TECHNIQUE: The patient was brought to the operating room and identified by name clinic number.  They were placed supinely on the operating room table and general endotracheal anesthesia was induced by the anesthesia service.  The bed was rotated 90 degrees and the patient was prepped and draped in standard fashion.  After standard surgical pause, the ear/tragus was injected with 1% lidocaine with 1:100,000 epinephrine.  A 15 blade was used to make a posterior tragal cartilage incision preserving the dome of the cartilage.  A curved iris scissors was used to widely dissect a cartilage graft. I was able to obtain a reasonable size graft.  Hemostasis was achieved and the incision was closed with multiple interrupted 5-0 chromic sutures.  The graft was set on the back table for later use.     The 0 and 30 degree rigid endoscopes were brought to the field and were used throughout the remainder the case.  First, the edges of the anterior perforation were freshened using a Pereyra needle and alligator forceps.  The perforation did abut the annulus anteriorly.   The perforation was measured using foil from a suture packet.  The foil template was brought to the back table and used to measure the graft.  A butterfly graft was fashioned leaving the perichondrium on both sides of the cartilage.       The first graft was then placed in the ear canal.  Using a straight pick and Pereyra needle, I maneuvered the cartilage graft into the perforation engaging the anterior portion first and then the posterior portion of the cartilage graft second.  The butterfly graft fit well tongue and groove in the tympanic membrane with the appropriate cut out for the anterior annulus.  The graft appeared to be quite stable.      Next, the edges of the posterior perforation were freshened using a Pereyra needle and alligator forceps.  The perforation did abut the annulus posteriorly.  A few adhesions to the incudostapedial joint that were lysed with a Pereyra needle.  The perforation was measured using foil from a suture packet.  The foil template was brought to the back table and used to measure the graft.  A butterfly graft was fashioned leaving the perichondrium on both sides of the cartilage.      The second graft was then placed in the ear canal.  Using a straight pick and Pereyra needle, I maneuvered the cartilage graft into the perforation engaging the anterior portion first and then the posterior portion of the cartilage graft second.  The butterfly graft fit well tongue and groove in the tympanic membrane with the appropriate cut out for the posterior annulus.  The graft appeared to be quite stable.                  A Gelfoam disc was then placed over the tympanic membrane and multiple pieces of soaked Gelfoam were used to fill the ear canal.  A cottonball and a Band-Aid was placed at the end of the procedure.     This marked the end of the procedure.  The patient was then turned over to anesthesia for recovery where they were awakened, extubated, and transferred to the PACU in excellent  condition.  There were no complications.  There was minimal blood loss.  All standard operating room protocol and universal precautions were used throughout the procedure.     Andrei Moyer MD  Department of Otolaryngology-Head and Neck Surgery  [facility]

## 2023-11-03 NOTE — DISCHARGE INSTRUCTIONS
Discharge Instructions for Tympanoplasty    Recovery - Everyone recovers differently from a general anesthetic.  Symptoms such as fatigue, nausea, lightheadedness, and sometimes a low grade fever (up to 101 degrees) are not unusual.  As your body removes the anesthetic drugs from circulation, these symptoms will resolve.  If you have an external incision, the area will be sore after surgery. A mild amount of swelling and redness around the incision is normal.  Use ice packs as needed.  There are no diet restrictions after an ear operation, and you can resume your home medications, except blood thinners such as aspirin or coumadin, which should not be taken for 1-5 days after surgery. Clarify the length of time with your surgeon. I recommend no heavy lifting greater than 15-20 pounds for the first 2-3 weeks following surgery.      Medications - Children and adults can return to all preoperative medications after this procedure, including blood thinners. Pain medication may have been sent home with you, but a vast majority of the time, over the counter Acetaminophen (Tylenol) or Ibuprofen (Advil) is sufficient.    Complications - A low grade fever (up to 101 degrees) is not unusual in the day after general anesthetic.  Treat this with a cool washcloth to the forehead and Tylenol or Ibuprofen.  If the fever is higher, or does not respond to medication, call Dr. Moyer's office or the on-call service after hours.  A small amount of bloody drainage can occur for several days after surgery, and is normal. An low grade ear ache is also normal and can be treated with Tylenol or Ibuprofen.     Precautions - Do not blow your nose for 4 weeks after surgery.  Cough and sneeze with your mouth open.  Please do prevent ALL water from swimming pools, lakes, rivers, streams, and ocean water from getting in the surgical ear.  We want to leave the ear drum undisturbed as it tries to heal.  DO NOT place ear plugs or other devices in  your ear canal. You can shower after surgery. A small cotton ball with Vaseline can be placed in the ear when showering. If an external incision is present, do not scrub the incision, but pat it dry when you are finished. Also, do not submerge the ear until you are cleared to do so.      Follow up - You have a prescription for eardrops.  Place 4-5 drops in the operated ear twice daily for 5-7 days prior to your follow-up appointment.  It is typical to return for a follow-up appointment in 3-4 weeks following surgery. If there are any questions or issues with the above, or if there are other issues that concern you, always feel free to call the clinic and I am happy to speak with you as soon as feasible.    Andrei Moyer MD  Department of Otolaryngology-Head and Neck Surgery  [facility]  [phone] or [phone] After hours, [facility] option                          Same Day Surgery Discharge Instructions  Special Precautions After Surgery - Adult    It is not unusual to feel lightheaded or faint, up to 24 hours after surgery or while taking pain medication.  If you have these symptoms; sit for a few minutes before standing and have someone assist you when getting up.  You should rest and relax for the next 24 hours and must have someone stay with you for at least 24 hours after your discharge.  DO NOT DRIVE any vehicle or operate mechanical equipment for 24 hours following the end of your surgery.  DO NOT DRIVE while taking narcotic pain medications that have been prescribed by your physician.  If you had a limb operated on, you must be able to use it fully to drive.  DO NOT drink alcoholic beverages for 24 hours following surgery or while taking prescription pain medication.  Drink clear liquids (apple juice, ginger ale, broth, 7-Up, etc.).  Progress to your regular diet as you feel able.  Any questions call your physician and do not make important decisions for 24  hours.      __________________________________________________________________________________________________________________________________  IMPORTANT NUMBERS:    [facility] [place] Main Number:  [phone], [phone]  Pharmacy:  [phone]  Same Day Surgery:  [phone], Monday - Friday until 8:30 p.m.  Urgent Care:  [phone]  Emergency Room:  [phone] [place] [address] Clinic:  [phone]                                                                             [facility]:  [phone] option [address] [place] [address] Orthopedics:  [phone]     OB Clinic:  [phone]   Surgery Specialty Clinic:  [phone]   Home Medical Equipment: [phone]  [facility]:  [phone]

## 2023-11-03 NOTE — ANESTHESIA CARE TRANSFER NOTE
Patient: Ilir Pal    Procedure: Procedure(s):  TYMPANOPLASTY  SURGICAL PROCUREMENT, GRAFT, TRAGAL CARTILAGE       Diagnosis: Perforation of tympanic membrane, left [H72.92]  Conductive hearing loss of left ear with unrestricted hearing of right ear [H90.12]  History of tympanoplasty of left ear [Z98.890]  Diagnosis Additional Information: No value filed.    Anesthesia Type:   General     Note:    Oropharynx: oropharynx clear of all foreign objects  Level of Consciousness: awake  Oxygen Supplementation: room air    Independent Airway: airway patency satisfactory and stable  Dentition: dentition unchanged  Vital Signs Stable: post-procedure vital signs reviewed and stable  Report to RN Given: handoff report given  Patient transferred to: PACU    Handoff Report: Identifed the Patient, Identified the Reponsible Provider, Reviewed the pertinent medical history, Discussed the surgical course, Reviewed Intra-OP anesthesia mangement and issues during anesthesia, Set expectations for post-procedure period and Allowed opportunity for questions and acknowledgement of understanding      Vitals:  Vitals Value Taken Time   /78 11/03/23 1123   Temp     Pulse 93 11/03/23 1127   Resp 8 11/03/23 1127   SpO2 97 % 11/03/23 1127   Vitals shown include unfiled device data.    Electronically Signed By: NASH Grant CRNA  November 3, 2023  11:28 AM

## 2023-11-03 NOTE — ANESTHESIA POSTPROCEDURE EVALUATION
Patient: Ilir Pal    Procedure: Procedure(s):  TYMPANOPLASTY  SURGICAL PROCUREMENT, GRAFT, TRAGAL CARTILAGE       Anesthesia Type:  General    Note:  Disposition: Outpatient   Postop Pain Control: Uneventful            Sign Out: Well controlled pain   PONV: No   Neuro/Psych: Uneventful            Sign Out: Acceptable/Baseline neuro status   Airway/Respiratory: Uneventful            Sign Out: Acceptable/Baseline resp. status   CV/Hemodynamics: Uneventful            Sign Out: Acceptable CV status; No obvious hypovolemia; No obvious fluid overload   Other NRE: NONE   DID A NON-ROUTINE EVENT OCCUR? No           Last vitals:  Vitals Value Taken Time   /85 11/03/23 1200   Temp 36.7  C (98  F) 11/03/23 1200   Pulse 93 11/03/23 1206   Resp 9 11/03/23 1206   SpO2 95 % 11/03/23 1204   Vitals shown include unfiled device data.    Electronically Signed By: NASH Grant CRNA  November 3, 2023  12:24 PM

## 2023-11-03 NOTE — INTERVAL H&P NOTE
"I have reviewed the surgical (or preoperative) H&P that is linked to this encounter, and examined the patient. There are no significant changes    Clinical Conditions Present on Arrival:  Clinically Significant Risk Factors Present on Admission                  # Obesity: Estimated body mass index is 32.92 kg/m  as calculated from the following:    Height as of this encounter: 1.803 m (5' 11\").    Weight as of this encounter: 107 kg (236 lb).       "

## 2023-11-09 NOTE — ADDENDUM NOTE
Addendum  created 11/09/23 1448 by Heidy Machuca APRN CRNA    Intraprocedure Event edited, Intraprocedure Staff edited

## 2023-11-20 NOTE — PROGRESS NOTES
Chief Complaint   Patient presents with    Post-op Visit      leftTympanoplasty on 1113/23- c/o feeling lightheaded with popping of ear     History of Present Illness  Ilir Pal is a 23 year old male who presents today for follow-up.  The patient went to the operating room and underwent a left cartilage graft tympanoplasty on 11/3/2023.  Intraoperatively, the patient was actually found to have 2 perforations, 1 very anterior and inferior and the other known posterior-inferior perforation.  Both perforations repair with cartilage.  The patient returns today for follow-up.     From a symptom standpoint, the patient reports that the ear is plugged but not painful.  He has been using the drops.  He will get a little bit lightheaded when he pops his ear.  The patient had a normal postoperative course.  The patient has not had any problems with otalgia or otorrhea.  The patient is otherwise doing well and has no ENT related concerns.    Past Medical History  Patient Active Problem List   Diagnosis    History of substance abuse (H)    HTN (hypertension)    Chronic fatigue    Psoriasis    Vitamin D deficiency     Current Medications    Current Outpatient Medications:     buprenorphine-naloxone (SUBOXONE) 8-2 MG SUBL sublingual tablet, PLACE 1.5 TABLET UNDER THE TONGUE TWICE DAILY, Disp: , Rfl:     buPROPion (WELLBUTRIN XL) 150 MG 24 hr tablet, Take 1 tablet by mouth daily at 2 pm, Disp: , Rfl:     clindamycin (CLEOCIN) 300 MG capsule, take 1 capsule by mouth every 8 hours, Disp: , Rfl:     clindamycin (CLINDAMAX) 1 % topical gel, Apply topically 2 times daily, Disp: , Rfl:     cloNIDine (CATAPRES) 0.2 MG tablet, Take 0.2 mg by mouth At Bedtime, Disp: , Rfl:     D 1000 25 MCG (1000 UT) CHEW, Take 2,000 Units by mouth daily, Disp: , Rfl:     ketoconazole (NIZORAL) 2 % external shampoo, Apply topically daily as needed for itching or irritation, Disp: 120 mL, Rfl: 8    metroNIDAZOLE (METROGEL) 0.75 % external gel, Apply  topically 2 times daily, Disp: 45 g, Rfl: 11    tretinoin (RETIN-A) 0.025 % cream, Apply topically At Bedtime Apply to affected areas., Disp: , Rfl:     triamcinolone (KENALOG) 0.5 % cream, Apply topically 2 times daily Apply to affected areas., Disp: , Rfl:     ciprofloxacin-dexAMETHasone (CIPRODEX) 0.3-0.1 % otic suspension, Post-op: Place 5 drops both ears twice daily starting 5-7 days prior to postop appointment (11/20/2023). (Patient not taking: Reported on 11/27/2023), Disp: 10 mL, Rfl: 3    Allergies  Allergies   Allergen Reactions    Penicillins Rash       Social History  Social History     Socioeconomic History    Marital status: Single   Tobacco Use    Smoking status: Every Day     Types: Vaping Device     Passive exposure: Yes    Smokeless tobacco: Never   Vaping Use    Vaping Use: Every day    Start date: 10/23/2022    Substances: Nicotine, Flavoring    Devices: Disposable   Substance and Sexual Activity    Alcohol use: No     Alcohol/week: 0.0 standard drinks of alcohol    Drug use: Unknown     Types: Other     Comment: Drug use: No   Social History Narrative    Mom has 4 children.    Ilir loves school.   Lives with parents and sibs. Involved in sports.     Social Determinants of Health     Financial Resource Strain: Low Risk  (9/26/2023)    Financial Resource Strain     Within the past 12 months, have you or your family members you live with been unable to get utilities (heat, electricity) when it was really needed?: No   Food Insecurity: Low Risk  (9/26/2023)    Food Insecurity     Within the past 12 months, did you worry that your food would run out before you got money to buy more?: No     Within the past 12 months, did the food you bought just not last and you didn t have money to get more?: No   Transportation Needs: Low Risk  (9/26/2023)    Transportation Needs     Within the past 12 months, has lack of transportation kept you from medical appointments, getting your medicines, non-medical  meetings or appointments, work, or from getting things that you need?: No   Housing Stability: Low Risk  (9/26/2023)    Housing Stability     Do you have housing? : Yes     Are you worried about losing your housing?: No       Family History  Family History   Problem Relation Age of Onset    Substance Abuse Mother     Coronary Artery Disease Father         MI    Diabetes Maternal Grandmother     Coronary Artery Disease Maternal Grandmother         stents    Alcoholism Maternal Grandfather     Other - See Comments Paternal Grandfather         Stroke    Diabetes Paternal Grandfather         Diabetes    Diabetes Paternal Uncle         Diabetes    Cancer Other         Cancer,PAT GT AUNT       Review of Systems  As per HPI and PMHx, otherwise 10 system review including the head and neck, constitutional, eyes, respiratory, GI, skin, neurologic, lymphatic, endocrine, and allergy systems is negative.    Physical Exam  /87 (BP Location: Right arm, Patient Position: Chair, Cuff Size: Adult Large)   Pulse 87   Temp 97.8  F (36.6  C) (Tympanic)   Wt 109.8 kg (242 lb)   BMI 33.75 kg/m    GENERAL: Patient is a pleasant, cooperative 23 year old male in no acute distress.  HEAD: Normocephalic, atraumatic.  Hair and scalp are normal.  EYES: Pupils are equal, round, reactive to light and accommodation.  Extraocular movements are intact.  The sclera nonicteric without injection.  The extraocular structures are normal.  EARS: See below.  NOSE: Nares are patent.  Nasal mucosa is pink and moist.  Negative anterior rhinoscopy.  NEUROLOGIC: Cranial nerves II through XII are grossly intact.  Voice is strong.  Patient is House-Brackman I/VI bilaterally.  CARDIOVASCULAR: Extremities are warm and well-perfused.  No significant peripheral edema.  RESPIRATORY: Patient has nonlabored breathing without cough, wheeze, stridor.  PSYCHIATRIC: Patient is alert and oriented.  Mood and affect appear normal.  SKIN: Warm and dry.  No scalp, face,  or neck lesions noted.    Physical Exam and Procedure    EARS: Normal shape and symmetry.  No tenderness when palpating the mastoid or tragal areas bilaterally.  The ears were then examined under the otic binocular microscope to perform cerumen removal.  Otoscopic exam on the right reveals a clear canal the right tympanic membrane was round, intact without evidence of effusion.  No retraction, granulation, drainage, or evidence of cholesteatoma.      Attention was turned to the left ear.  Otoscopic exam on the left reveals Gelfoam and ceruminous debris impacted down to the level of the tympanic membrane.  The moist Gelfoam and cerumen were removed with a #3 and #7 suction.  The left tympanic membrane peers to be intact with the large cartilage graft more posteriorly in the anterior cartilage and fascia graft appears to be well-healed.  No retraction, granulation, drainage, or evidence of cholesteatoma.          Assessment and Plan    ICD-10-CM    1. Perforation of tympanic membrane, left  H72.92 Remove Cerumen      2. Conductive hearing loss of left ear with unrestricted hearing of right ear  H90.12 Remove Cerumen      3. History of tympanoplasty of left ear  Z98.890 Remove Cerumen      4. Status post ear surgery  Z98.890 Remove Cerumen      5. Tobacco dependence  F17.200 Remove Cerumen      6. Encounter for tobacco use cessation counseling  Z71.6 Remove Cerumen      7. Postoperative state  Z98.890 Remove Cerumen         It was my pleasure seeing Ilir and their family today in clinic.  The patient appears to be healing well after his left cartilage graft tympanoplasty.  I do not see any obvious perforation on examination today.  At this point in time, we will relax his water precautions.  The patient need to follow-up in 2 to 3 months with a hearing test.  Family knows to contact me with problems or concerns.    The plan was discussed in detail with the patient's family.  Questions were answered the best my  ability.  They voiced understanding agree with the plan.    Andrei Moyer MD  Department of Otolaryngology-Head and Neck Surgery  [facility]

## 2023-11-27 ENCOUNTER — OFFICE VISIT (OUTPATIENT)
Dept: OTOLARYNGOLOGY | Facility: CLINIC | Age: 23
End: 2023-11-27
Payer: COMMERCIAL

## 2023-11-27 VITALS
BODY MASS INDEX: 33.75 KG/M2 | WEIGHT: 242 LBS | HEART RATE: 87 BPM | TEMPERATURE: 97.8 F | SYSTOLIC BLOOD PRESSURE: 130 MMHG | DIASTOLIC BLOOD PRESSURE: 87 MMHG

## 2023-11-27 DIAGNOSIS — Z98.890 STATUS POST EAR SURGERY: ICD-10-CM

## 2023-11-27 DIAGNOSIS — Z71.6 ENCOUNTER FOR TOBACCO USE CESSATION COUNSELING: ICD-10-CM

## 2023-11-27 DIAGNOSIS — Z98.890 HISTORY OF TYMPANOPLASTY OF LEFT EAR: ICD-10-CM

## 2023-11-27 DIAGNOSIS — H90.12 CONDUCTIVE HEARING LOSS OF LEFT EAR WITH UNRESTRICTED HEARING OF RIGHT EAR: ICD-10-CM

## 2023-11-27 DIAGNOSIS — F17.200 TOBACCO DEPENDENCE: ICD-10-CM

## 2023-11-27 DIAGNOSIS — Z98.890 POSTOPERATIVE STATE: ICD-10-CM

## 2023-11-27 DIAGNOSIS — H72.92 PERFORATION OF TYMPANIC MEMBRANE, LEFT: Primary | ICD-10-CM

## 2023-11-27 PROCEDURE — 99207 PR NO CHARGE LOS: CPT | Performed by: OTOLARYNGOLOGY

## 2023-11-27 PROCEDURE — 69210 REMOVE IMPACTED EAR WAX UNI: CPT | Mod: 58 | Performed by: OTOLARYNGOLOGY

## 2023-11-27 NOTE — LETTER
11/27/2023         RE: Ilir Pal  [address] [place] [address]  [place] [address]        Dear Colleague,    Thank you for referring your patient, Ilir Pal, to the [facility]. Please see a copy of my visit note below.    Chief Complaint   Patient presents with     Post-op Visit      leftTympanoplasty on 1113/23- c/o feeling lightheaded with popping of ear     History of Present Illness  Ilir Pal is a 23 year old male who presents today for follow-up.  The patient went to the operating room and underwent a left cartilage graft tympanoplasty on 11/3/2023.  Intraoperatively, the patient was actually found to have 2 perforations, 1 very anterior and inferior and the other known posterior-inferior perforation.  Both perforations repair with cartilage.  The patient returns today for follow-up.     From a symptom standpoint, the patient reports that the ear is plugged but not painful.  He has been using the drops.  He will get a little bit lightheaded when he pops his ear.  The patient had a normal postoperative course.  The patient has not had any problems with otalgia or otorrhea.  The patient is otherwise doing well and has no ENT related concerns.    Past Medical History  Patient Active Problem List   Diagnosis     History of substance abuse (H)     HTN (hypertension)     Chronic fatigue     Psoriasis     Vitamin D deficiency     Current Medications    Current Outpatient Medications:      buprenorphine-naloxone (SUBOXONE) 8-2 MG SUBL sublingual tablet, PLACE 1.5 TABLET UNDER THE TONGUE TWICE DAILY, Disp: , Rfl:      buPROPion (WELLBUTRIN XL) 150 MG 24 hr tablet, Take 1 tablet by mouth daily at 2 pm, Disp: , Rfl:      clindamycin (CLEOCIN) 300 MG capsule, take 1 capsule by mouth every 8 hours, Disp: , Rfl:      clindamycin (CLINDAMAX) 1 % topical gel, Apply topically 2 times daily, Disp: , Rfl:      cloNIDine (CATAPRES) 0.2 MG tablet, Take 0.2 mg by mouth At Bedtime, Disp: , Rfl:       D 1000 25 MCG (1000 UT) CHEW, Take 2,000 Units by mouth daily, Disp: , Rfl:      ketoconazole (NIZORAL) 2 % external shampoo, Apply topically daily as needed for itching or irritation, Disp: 120 mL, Rfl: 8     metroNIDAZOLE (METROGEL) 0.75 % external gel, Apply topically 2 times daily, Disp: 45 g, Rfl: 11     tretinoin (RETIN-A) 0.025 % cream, Apply topically At Bedtime Apply to affected areas., Disp: , Rfl:      triamcinolone (KENALOG) 0.5 % cream, Apply topically 2 times daily Apply to affected areas., Disp: , Rfl:      ciprofloxacin-dexAMETHasone (CIPRODEX) 0.3-0.1 % otic suspension, Post-op: Place 5 drops both ears twice daily starting 5-7 days prior to postop appointment (11/20/2023). (Patient not taking: Reported on 11/27/2023), Disp: 10 mL, Rfl: 3    Allergies  Allergies   Allergen Reactions     Penicillins Rash       Social History  Social History     Socioeconomic History     Marital status: Single   Tobacco Use     Smoking status: Every Day     Types: Vaping Device     Passive exposure: Yes     Smokeless tobacco: Never   Vaping Use     Vaping Use: Every day     Start date: 10/23/2022     Substances: Nicotine, Flavoring     Devices: Disposable   Substance and Sexual Activity     Alcohol use: No     Alcohol/week: 0.0 standard drinks of alcohol     Drug use: Unknown     Types: Other     Comment: Drug use: No   Social History Narrative    Mom has 4 children.    Ilir loves school.   Lives with parents and sibs. Involved in sports.     Social Determinants of Health     Financial Resource Strain: Low Risk  (9/26/2023)    Financial Resource Strain      Within the past 12 months, have you or your family members you live with been unable to get utilities (heat, electricity) when it was really needed?: No   Food Insecurity: Low Risk  (9/26/2023)    Food Insecurity      Within the past 12 months, did you worry that your food would run out before you got money to buy more?: No      Within the past 12 months, did the  food you bought just not last and you didn t have money to get more?: No   Transportation Needs: Low Risk  (9/26/2023)    Transportation Needs      Within the past 12 months, has lack of transportation kept you from medical appointments, getting your medicines, non-medical meetings or appointments, work, or from getting things that you need?: No   Housing Stability: Low Risk  (9/26/2023)    Housing Stability      Do you have housing? : Yes      Are you worried about losing your housing?: No       Family History  Family History   Problem Relation Age of Onset     Substance Abuse Mother      Coronary Artery Disease Father         MI     Diabetes Maternal Grandmother      Coronary Artery Disease Maternal Grandmother         stents     Alcoholism Maternal Grandfather      Other - See Comments Paternal Grandfather         Stroke     Diabetes Paternal Grandfather         Diabetes     Diabetes Paternal Uncle         Diabetes     Cancer Other         Cancer,PAT GT AUNT       Review of Systems  As per HPI and PMHx, otherwise 10 system review including the head and neck, constitutional, eyes, respiratory, GI, skin, neurologic, lymphatic, endocrine, and allergy systems is negative.    Physical Exam  /87 (BP Location: Right arm, Patient Position: Chair, Cuff Size: Adult Large)   Pulse 87   Temp 97.8  F (36.6  C) (Tympanic)   Wt 109.8 kg (242 lb)   BMI 33.75 kg/m    GENERAL: Patient is a pleasant, cooperative 23 year old male in no acute distress.  HEAD: Normocephalic, atraumatic.  Hair and scalp are normal.  EYES: Pupils are equal, round, reactive to light and accommodation.  Extraocular movements are intact.  The sclera nonicteric without injection.  The extraocular structures are normal.  EARS: See below.  NOSE: Nares are patent.  Nasal mucosa is pink and moist.  Negative anterior rhinoscopy.  NEUROLOGIC: Cranial nerves II through XII are grossly intact.  Voice is strong.  Patient is House-Brackman I/VI  bilaterally.  CARDIOVASCULAR: Extremities are warm and well-perfused.  No significant peripheral edema.  RESPIRATORY: Patient has nonlabored breathing without cough, wheeze, stridor.  PSYCHIATRIC: Patient is alert and oriented.  Mood and affect appear normal.  SKIN: Warm and dry.  No scalp, face, or neck lesions noted.    Physical Exam and Procedure    EARS: Normal shape and symmetry.  No tenderness when palpating the mastoid or tragal areas bilaterally.  The ears were then examined under the otic binocular microscope to perform cerumen removal.  Otoscopic exam on the right reveals a clear canal the right tympanic membrane was round, intact without evidence of effusion.  No retraction, granulation, drainage, or evidence of cholesteatoma.      Attention was turned to the left ear.  Otoscopic exam on the left reveals Gelfoam and ceruminous debris impacted down to the level of the tympanic membrane.  The moist Gelfoam and cerumen were removed with a #3 and #7 suction.  The left tympanic membrane peers to be intact with the large cartilage graft more posteriorly in the anterior cartilage and fascia graft appears to be well-healed.  No retraction, granulation, drainage, or evidence of cholesteatoma.          Assessment and Plan    ICD-10-CM    1. Perforation of tympanic membrane, left  H72.92 Remove Cerumen      2. Conductive hearing loss of left ear with unrestricted hearing of right ear  H90.12 Remove Cerumen      3. History of tympanoplasty of left ear  Z98.890 Remove Cerumen      4. Status post ear surgery  Z98.890 Remove Cerumen      5. Tobacco dependence  F17.200 Remove Cerumen      6. Encounter for tobacco use cessation counseling  Z71.6 Remove Cerumen      7. Postoperative state  Z98.890 Remove Cerumen         It was my pleasure seeing Ilir and their family today in clinic.  The patient appears to be healing well after his left cartilage graft tympanoplasty.  I do not see any obvious perforation on examination  today.  At this point in time, we will relax his water precautions.  The patient need to follow-up in 2 to 3 months with a hearing test.  Family knows to contact me with problems or concerns.    The plan was discussed in detail with the patient's family.  Questions were answered the best my ability.  They voiced understanding agree with the plan.    Andrei Moyer MD  Department of Otolaryngology-Head and Neck Surgery  [facility]       Again, thank you for allowing me to participate in the care of your patient.        Sincerely,        Andrei Moyer MD

## 2023-11-27 NOTE — NURSING NOTE
"Initial /87 (BP Location: Right arm, Patient Position: Chair, Cuff Size: Adult Large)   Pulse 87   Temp 97.8  F (36.6  C) (Tympanic)   Wt 109.8 kg (242 lb)   BMI 33.75 kg/m   Estimated body mass index is 33.75 kg/m  as calculated from the following:    Height as of 11/3/23: 1.803 m (5' 11\").    Weight as of this encounter: 109.8 kg (242 lb). .  Janis Cedeño LPN    "

## (undated) DEVICE — BLADE KNIFE SURG 15 371115

## (undated) DEVICE — SOL WATER IRRIG 1000ML BOTTLE 07139-09

## (undated) DEVICE — ANTIFOG SOLUTION W/FOAM PAD 31142527

## (undated) DEVICE — DRAPE MICRO ZEISS MD 48"X120CM

## (undated) DEVICE — GLOVE BIOGEL PI ULTRATOUCH G SZ 7.5 42175

## (undated) DEVICE — SYR 05ML LL W/O NDL

## (undated) DEVICE — GOWN XLG DISP 9545

## (undated) DEVICE — SU CHROMIC 5-0 P-3 18" 687G

## (undated) DEVICE — SYR 10ML FINGER CONTROL W/O NDL 309695

## (undated) DEVICE — PREP POVIDONE IODINE SWABS X3

## (undated) DEVICE — TUBING SUCTION 12"X1/4" N612

## (undated) DEVICE — SPONGE RAY-TEC 4X8" 7318

## (undated) DEVICE — DRSG COTTON BALL 6PK LCB62

## (undated) DEVICE — BASIN SET MINOR DISP

## (undated) DEVICE — GOWN LG DISP 9515

## (undated) DEVICE — DRAPE STERI TOWEL SM 1000

## (undated) DEVICE — NDL 25GA 1.5" 305127

## (undated) DEVICE — DRAPE U SPLIT 74X120" 29440

## (undated) DEVICE — DRAPE MAYO STAND 23X54 8337

## (undated) DEVICE — LIGHT HANDLE X2

## (undated) DEVICE — ESU CORD BIPOLAR GREEN 10-4000

## (undated) DEVICE — SOL NACL 0.9% IRRIG 1000ML BOTTLE 07138-09

## (undated) DEVICE — SPONGE SURGIFOAM 100 1974

## (undated) DEVICE — PACK BASIC 9103

## (undated) RX ORDER — DEXAMETHASONE SODIUM PHOSPHATE 4 MG/ML
INJECTION, SOLUTION INTRA-ARTICULAR; INTRALESIONAL; INTRAMUSCULAR; INTRAVENOUS; SOFT TISSUE
Status: DISPENSED
Start: 2023-11-03

## (undated) RX ORDER — CEFAZOLIN SODIUM 1 G/3ML
INJECTION, POWDER, FOR SOLUTION INTRAMUSCULAR; INTRAVENOUS
Status: DISPENSED
Start: 2023-11-03

## (undated) RX ORDER — MAGNESIUM SULFATE HEPTAHYDRATE 40 MG/ML
INJECTION, SOLUTION INTRAVENOUS
Status: DISPENSED
Start: 2023-11-03

## (undated) RX ORDER — ONDANSETRON 2 MG/ML
INJECTION INTRAMUSCULAR; INTRAVENOUS
Status: DISPENSED
Start: 2023-11-03

## (undated) RX ORDER — KETOROLAC TROMETHAMINE 30 MG/ML
INJECTION, SOLUTION INTRAMUSCULAR; INTRAVENOUS
Status: DISPENSED
Start: 2023-11-03

## (undated) RX ORDER — LIDOCAINE HYDROCHLORIDE AND EPINEPHRINE 10; 10 MG/ML; UG/ML
INJECTION, SOLUTION INFILTRATION; PERINEURAL
Status: DISPENSED
Start: 2023-11-03

## (undated) RX ORDER — GABAPENTIN 300 MG/1
CAPSULE ORAL
Status: DISPENSED
Start: 2023-11-03

## (undated) RX ORDER — FENTANYL CITRATE 50 UG/ML
INJECTION, SOLUTION INTRAMUSCULAR; INTRAVENOUS
Status: DISPENSED
Start: 2023-11-03

## (undated) RX ORDER — ACETAMINOPHEN 325 MG/1
TABLET ORAL
Status: DISPENSED
Start: 2023-11-03

## (undated) RX ORDER — FENTANYL CITRATE-0.9 % NACL/PF 10 MCG/ML
PLASTIC BAG, INJECTION (ML) INTRAVENOUS
Status: DISPENSED
Start: 2023-11-03

## (undated) RX ORDER — PROPOFOL 10 MG/ML
INJECTION, EMULSION INTRAVENOUS
Status: DISPENSED
Start: 2023-11-03